# Patient Record
Sex: FEMALE | Race: WHITE | NOT HISPANIC OR LATINO | Employment: FULL TIME | URBAN - METROPOLITAN AREA
[De-identification: names, ages, dates, MRNs, and addresses within clinical notes are randomized per-mention and may not be internally consistent; named-entity substitution may affect disease eponyms.]

---

## 2023-09-15 ENCOUNTER — HOSPITAL ENCOUNTER (OUTPATIENT)
Dept: DATA CONVERSION | Facility: HOSPITAL | Age: 63
Discharge: HOME | End: 2023-09-15
Payer: COMMERCIAL

## 2023-09-15 DIAGNOSIS — C50.411 MALIGNANT NEOPLASM OF UPPER-OUTER QUADRANT OF RIGHT FEMALE BREAST (MULTI): ICD-10-CM

## 2023-11-06 ENCOUNTER — DOCUMENTATION (OUTPATIENT)
Dept: HEMATOLOGY/ONCOLOGY | Facility: HOSPITAL | Age: 63
End: 2023-11-06
Payer: COMMERCIAL

## 2023-11-06 PROBLEM — E78.5 HYPERLIPIDEMIA: Status: ACTIVE | Noted: 2023-11-06

## 2023-11-06 PROBLEM — Z90.710 HISTORY OF HYSTERECTOMY: Status: ACTIVE | Noted: 2023-11-06

## 2023-11-06 PROBLEM — R92.8 ABNORMAL FINDINGS ON DIAGNOSTIC IMAGING OF BREAST: Status: ACTIVE | Noted: 2023-11-06

## 2023-11-06 PROBLEM — E55.9 VITAMIN D DEFICIENCY: Status: ACTIVE | Noted: 2023-11-06

## 2023-11-06 PROBLEM — F32.A DEPRESSED: Status: ACTIVE | Noted: 2023-11-06

## 2023-11-06 PROBLEM — C50.419 MALIGNANT NEOPLASM OF UPPER-OUTER QUADRANT OF FEMALE BREAST (MULTI): Status: ACTIVE | Noted: 2023-11-06

## 2023-11-06 PROBLEM — F41.9 ANXIETY: Status: ACTIVE | Noted: 2023-11-06

## 2023-11-06 RX ORDER — CHOLECALCIFEROL (VITAMIN D3) 50 MCG
2 TABLET ORAL DAILY
COMMUNITY

## 2023-11-06 RX ORDER — UBIDECARENONE 60 MG
CAPSULE ORAL
COMMUNITY

## 2023-11-06 RX ORDER — UBIDECARENONE 50 MG
CAPSULE ORAL
COMMUNITY
End: 2024-01-16 | Stop reason: ALTCHOICE

## 2023-11-06 RX ORDER — ASCORBIC ACID 500 MG
1 TABLET ORAL DAILY
COMMUNITY

## 2023-11-06 RX ORDER — ANASTROZOLE 1 MG/1
1 TABLET ORAL DAILY
COMMUNITY
End: 2024-01-16 | Stop reason: ALTCHOICE

## 2023-11-06 RX ORDER — NAPROXEN SODIUM 220 MG/1
1 TABLET ORAL DAILY
COMMUNITY

## 2023-11-06 RX ORDER — GLUCOSAM/CHONDRO/HERB 149/HYAL 750-100 MG
1 TABLET ORAL DAILY
COMMUNITY

## 2023-11-06 RX ORDER — ERGOCALCIFEROL 1.25 MG/1
1 CAPSULE ORAL
COMMUNITY
End: 2024-01-16 | Stop reason: ALTCHOICE

## 2023-11-07 ENCOUNTER — APPOINTMENT (OUTPATIENT)
Dept: HEMATOLOGY/ONCOLOGY | Facility: HOSPITAL | Age: 63
End: 2023-11-07
Payer: COMMERCIAL

## 2023-11-07 ENCOUNTER — OFFICE VISIT (OUTPATIENT)
Dept: HEMATOLOGY/ONCOLOGY | Facility: HOSPITAL | Age: 63
End: 2023-11-07
Payer: COMMERCIAL

## 2023-11-07 VITALS
DIASTOLIC BLOOD PRESSURE: 69 MMHG | RESPIRATION RATE: 18 BRPM | OXYGEN SATURATION: 97 % | HEART RATE: 94 BPM | WEIGHT: 164.68 LBS | BODY MASS INDEX: 27.44 KG/M2 | SYSTOLIC BLOOD PRESSURE: 97 MMHG | HEIGHT: 65 IN | TEMPERATURE: 97.9 F

## 2023-11-07 DIAGNOSIS — Z85.3 HISTORY OF RIGHT BREAST CANCER: Primary | ICD-10-CM

## 2023-11-07 DIAGNOSIS — Z17.0 MALIGNANT NEOPLASM OF UPPER-OUTER QUADRANT OF RIGHT BREAST IN FEMALE, ESTROGEN RECEPTOR POSITIVE (MULTI): Primary | ICD-10-CM

## 2023-11-07 DIAGNOSIS — C50.411 MALIGNANT NEOPLASM OF UPPER-OUTER QUADRANT OF RIGHT BREAST IN FEMALE, ESTROGEN RECEPTOR POSITIVE (MULTI): Primary | ICD-10-CM

## 2023-11-07 PROCEDURE — 99214 OFFICE O/P EST MOD 30 MIN: CPT | Performed by: NURSE PRACTITIONER

## 2023-11-07 RX ORDER — AMPICILLIN TRIHYDRATE 250 MG
CAPSULE ORAL
COMMUNITY

## 2023-11-07 RX ORDER — ANASTROZOLE 1 MG/1
1 TABLET ORAL DAILY
Qty: 30 TABLET | Refills: 5 | Status: SHIPPED | OUTPATIENT
Start: 2023-11-07 | End: 2024-03-19 | Stop reason: SDUPTHER

## 2023-11-07 RX ORDER — GLUCOSAMINE HCL 500 MG
TABLET ORAL
COMMUNITY
End: 2024-01-16 | Stop reason: ALTCHOICE

## 2023-11-07 RX ORDER — FAMOTIDINE 10 MG/ML
20 INJECTION INTRAVENOUS ONCE AS NEEDED
Status: CANCELLED | OUTPATIENT
Start: 2024-01-15

## 2023-11-07 RX ORDER — DIPHENHYDRAMINE HYDROCHLORIDE 50 MG/ML
50 INJECTION INTRAMUSCULAR; INTRAVENOUS AS NEEDED
Status: CANCELLED | OUTPATIENT
Start: 2024-01-15

## 2023-11-07 RX ORDER — EPINEPHRINE 0.3 MG/.3ML
0.3 INJECTION SUBCUTANEOUS EVERY 5 MIN PRN
Status: CANCELLED | OUTPATIENT
Start: 2024-01-15

## 2023-11-07 RX ORDER — ACETAMINOPHEN 500 MG
2 TABLET ORAL DAILY
COMMUNITY
End: 2024-01-16 | Stop reason: ALTCHOICE

## 2023-11-07 RX ORDER — SERTRALINE HYDROCHLORIDE 100 MG/1
100 TABLET, FILM COATED ORAL DAILY
COMMUNITY
End: 2024-01-16 | Stop reason: ALTCHOICE

## 2023-11-07 RX ORDER — ALBUTEROL SULFATE 0.83 MG/ML
3 SOLUTION RESPIRATORY (INHALATION) AS NEEDED
Status: CANCELLED | OUTPATIENT
Start: 2024-01-15

## 2023-11-07 RX ORDER — OMEGA-3-ACID ETHYL ESTERS 1 G/1
1200 CAPSULE, LIQUID FILLED ORAL DAILY
COMMUNITY
End: 2024-01-16 | Stop reason: ALTCHOICE

## 2023-11-07 ASSESSMENT — PATIENT HEALTH QUESTIONNAIRE - PHQ9
4. FEELING TIRED OR HAVING LITTLE ENERGY: NOT AT ALL
2. FEELING DOWN, DEPRESSED OR HOPELESS: NOT AT ALL
5. POOR APPETITE OR OVEREATING: 0
8. MOVING OR SPEAKING SO SLOWLY THAT OTHER PEOPLE COULD HAVE NOTICED. OR THE OPPOSITE, BEING SO FIGETY OR RESTLESS THAT YOU HAVE BEEN MOVING AROUND A LOT MORE THAN USUAL: NOT AT ALL
3. TROUBLE FALLING OR STAYING ASLEEP OR SLEEPING TOO MUCH: NOT AT ALL
8. MOVING OR SPEAKING SO SLOWLY THAT OTHER PEOPLE COULD HAVE NOTICED. OR THE OPPOSITE, BEING SO FIGETY OR RESTLESS THAT YOU HAVE BEEN MOVING AROUND A LOT MORE THAN USUAL: NOT AT ALL
5. POOR APPETITE OR OVEREATING: NOT AT ALL
7. TROUBLE CONCENTRATING ON THINGS, SUCH AS READING THE NEWSPAPER OR WATCHING TELEVISION: 0
1. LITTLE INTEREST OR PLEASURE IN DOING THINGS: NOT AT ALL
7. TROUBLE CONCENTRATING ON THINGS, SUCH AS READING THE NEWSPAPER OR WATCHING TELEVISION: NOT AT ALL
1. LITTLE INTEREST OR PLEASURE IN DOING THINGS: 0
6. FEELING BAD ABOUT YOURSELF - OR THAT YOU ARE A FAILURE OR HAVE LET YOURSELF OR YOUR FAMILY DOWN: 0
SUM OF ALL RESPONSES TO PHQ QUESTIONS 1-9: 0
3. TROUBLE FALLING OR STAYING ASLEEP OR SLEEPING TOO MUCH: NOT AT ALL
4. FEELING TIRED OR HAVING LITTLE ENERGY: NOT AT ALL
1. LITTLE INTEREST OR PLEASURE IN DOING THINGS: NOT AT ALL
3. TROUBLE FALLING OR STAYING ASLEEP OR SLEEPING TOO MUCH: 0
5. POOR APPETITE OR OVEREATING: NOT AT ALL
6. FEELING BAD ABOUT YOURSELF - OR THAT YOU ARE A FAILURE OR HAVE LET YOURSELF OR YOUR FAMILY DOWN: NOT AT ALL
10. IF YOU CHECKED OFF ANY PROBLEMS, HOW DIFFICULT HAVE THESE PROBLEMS MADE IT FOR YOU TO DO YOUR WORK, TAKE CARE OF THINGS AT HOME, OR GET ALONG WITH OTHER PEOPLE: NOT DIFFICULT AT ALL
6. FEELING BAD ABOUT YOURSELF - OR THAT YOU ARE A FAILURE OR HAVE LET YOURSELF OR YOUR FAMILY DOWN: NOT AT ALL
8. MOVING OR SPEAKING SO SLOWLY THAT OTHER PEOPLE COULD HAVE NOTICED. OR THE OPPOSITE, BEING SO FIGETY OR RESTLESS THAT YOU HAVE BEEN MOVING AROUND A LOT MORE THAN USUAL: 0
9. THOUGHTS THAT YOU WOULD BE BETTER OFF DEAD, OR OF HURTING YOURSELF: NOT AT ALL
9. THOUGHTS THAT YOU WOULD BE BETTER OFF DEAD, OR OF HURTING YOURSELF: 0
2. FEELING DOWN, DEPRESSED, IRRITABLE, OR HOPELESS: 0
7. TROUBLE CONCENTRATING ON THINGS, SUCH AS READING THE NEWSPAPER OR WATCHING TELEVISION: NOT AT ALL
4. FEELING TIRED OR HAVING LITTLE ENERGY: 0
9. THOUGHTS THAT YOU WOULD BE BETTER OFF DEAD, OR OF HURTING YOURSELF: NOT AT ALL
10. IF YOU CHECKED OFF ANY PROBLEMS, HOW DIFFICULT HAVE THESE PROBLEMS MADE IT FOR YOU TO DO YOUR WORK, TAKE CARE OF THINGS AT HOME, OR GET ALONG WITH OTHER PEOPLE: NOT DIFFICULT AT ALL
2. FEELING DOWN, DEPRESSED, IRRITABLE, OR HOPELESS: NOT AT ALL
SUM OF ALL RESPONSES TO PHQ QUESTIONS 1-9: 0

## 2023-11-07 ASSESSMENT — PAIN SCALES - GENERAL: PAINLEVEL: 0-NO PAIN

## 2023-11-07 NOTE — PATIENT INSTRUCTIONS
1. Exercise 2.5 hours per week; bone strengthening, cardio-vascular, resistance training.  2. Please do self breast exams monthly.  3. Keep alcohol under 3 drinks per week.  4. Sun safety - limit sun exposure from 11a-2p when its at its hottest, apply 15-30 sun block and re-apply every 1-2 hours if perspiring or swimming.  5. Eat a plant based diet, add in oily fishes such as mackerel, tuna, and salmon.  6. Get in at least 1,000 mg of calcium per day through diet or supplement for bone strength. Examples of foods higher in calcium are milk, yogurt, fruited yogurt, oranges, fortified orange juice, almonds, almond milk, broccoli, spinach, bok dmitri, mustard greens, puddings, custards, ice cream, fortified cereals, bars, and crackers.   7. Continue anastrozole 1mg daily. I will send in a new prescription.  8. Please call the office if any new mass or rash in or around breast, or any uncontrolled symptoms that last over 2-3 weeks at [phone].  9. We will start your Zometa infusions in January. Please remember to have your labs drawn 1-2 weeks before your infusion or it cannot be given. You can go to the closest  facility to have those drawn. Our schedules will call you with that appointment.  10. It was so nice meeting you today, Rebeca.  I will see you back in April.

## 2023-11-07 NOTE — PROGRESS NOTES
Visit Type: Follow Up Visit      Cancer History:          Breast         AJCC Edition: 8th (AJCC), Diagnosis Date: 04-Nov-2022, IA, pT1b pN1mi cM0 G1     Treatment Synopsis:    63-year-old postmenopausal  female with right-sided invasive ductal carcinoma, stage IA (T1b N1mi M0). The patient's breast cancer was diagnosed on November 14, 2022, and is grade 1, estrogen receptor positive at 80- 90%, progesterone receptor positive at 80-90%, and HER-2/samantha negative. MammaPrint Index = -0.024; translating to High Risk Luminal-Type B.        Details of her history are as follows:      04//02/2022: Patient underwent a routine screening mammogram. This revealed an asymmetry in her right breast.    4/05/2022: Patient underwent a right diagnostic mammogram and US. This confirmed an asymmetry in the left breast that was non-revealing on the US.   11/02/2022: Patient underwent a repeat 6-month right diagnostic mammogram and US. This revealed a 0.5cm mass in the right breast at 11-12:00, 7.4cm from the nipple measuring 0.5cm. No abnormal LNs were seen in the right axilla.   11/04/2022: Patient underwent and US -guided needle biopsy of the right breast at 12:00. Pathology was consistent with above   01/05/2023: Patient underwent a right partial mastectomy with SLNB. Pathology showed an 8mm invasive ductal carcinoma, grade 1 with negative margins, no LVI with 1/1 SLN with micrometastatic disease measuring 0.5mm           History of Present Illness:      ID Statement:    CLAU NIX is a 63 year old Female        Chief Complaint: Here for a follow-up visit      Interval History:    Ms. Nix is a pleasant 63-year-old postmenopausal  female with right-sided invasive ductal carcinoma, stage IA (T1b N1mi M0). The patient's breast cancer  was diagnosed on November 14, 2022, and is grade 1, estrogen receptor positive at 80- 90%, progesterone receptor positive at 80-90%, and HER-2/samantha negative. MammaPrint Index =  -0.024; translating to High Risk Luminal-Type B.     INTERVAL HISTORY  In the interval since her last visit, Ms. Flores reports she has completed radiation with no side effects. She comes in to finalize her endocrine treatment plan. She has no complaints.      Review of Systems:   Review of Systems:    ROS:  A 14-point review of system was completed and was negative except for what is noted in HPI.              Allergies and Intolerances:       Allergies:         No Known Allergies: Active     Outpatient Medication Profile:  * Patient Currently Takes Medications as of 02-May-2023 10:00 documented in Structured Notes          mg oral tablet : Last Dose Taken:  , prn         Multiple Vitamins oral gum: Last Dose Taken:           sertraline 100 mg oral tablet: Last Dose Taken:  , 1 tab(s) orally once  a day         Red Yeast Rice 600 mg oral capsule: Last Dose Taken:  , 2 cap(s) orally  once a day         Omega-3 oral capsule: Last Dose Taken:           glucosamine hydrochloride 1500 mg oral tablet: Last Dose Taken:  , 1  tab(s) orally once a day         cholecalciferol 25 mcg (1000 intl units) oral tablet, chewable: Last  Dose Taken:  , 2 tab(s) orally once a day         Coenzyme Q10 200 mg oral capsule: Last Dose Taken:           calcium citrate: Last Dose Taken:  , 600 milligram(s) orally         ascorbic acid: Last Dose Taken:  , 1000 milligram(s) orally         a c vinegar cap: Last Dose Taken:               Medical History:         Breast cancer, stage 1, estrogen receptor positive: ICD-10:  C50.919, Status: Active       Surg History:         History of partial mastectomy: ICD-10: Z90.10, Status: Active     Family History: No Family History items are recorded  in the problem list.       Social History:   Social Substance History:  ·  Smoking Status never smoker (1)   ·  Additional History           Family History:  Father had NHL, .     Breast Cancer Risk Factors:  , Had her menarche at age 15  years, 1st pregnancy at age 35 years, menopause at age 57 years, never used BCP or HRT  (1)           Performance:   ECOG Performance Status: 0 Fully Active         Vitals and Measurements:   Vitals: Temp: 36.1  HR: 74  RR: 18  BP: 114/74  SPO2%:   98   Measurements: HT(cm): 161.7  WT(kg): 79.2  BSA: 1.88   BMI:  30.2      Physical Exam:      Constitutional: Well developed, awake/alert/oriented  x3, no distress, alert and cooperative   Eyes: PERRL, EOMI, clear sclera   ENMT: mucous membranes moist, no apparent injury,  no lesions seen   Head/Neck: Neck supple, no apparent injury, thyroid  without mass or tenderness, No JVD, trachea midline, no bruits   Respiratory/Thorax: Patent airways, CTAB, normal  breath sounds with good chest expansion, thorax symmetric   Cardiovascular: Regular, rate and rhythm, no murmurs,  2+ equal pulses of the extremities, normal S 1and S 2   Gastrointestinal: Nondistended, soft, non-tender,  no rebound tenderness or guarding, no masses palpable, no organomegaly, +BS, no bruits   Musculoskeletal: ROM intact, no joint swelling, normal  strength   Extremities: normal extremities, no cyanosis edema,  contusions or wounds, no clubbing   Neurological: alert and oriented x3, intact senses,  motor, response and reflexes, normal strength   Breast: s/p rt sided partial mastectomy with well  healed surgical incision. No palpable mass in the left breast. No palpable axillary or supraclavicular lymphadenopathies bilaterally. No swellling of either upper extremity which had free range of motion.   Lymphatic: No significant lymphadenopathy   Psychological: Appropriate mood and behavior   Skin: Warm and dry, no lesions, no rashes      Assessment and Plan:      Assessment and Plan:   Assessment:    Ms. Ko is a pleasant 63-year-old postmenopausal  female with right-sided invasive ductal carcinoma, stage IA (T1b N1mi M0). The patient's breast cancer  was diagnosed on November 14, 2022, and is  grade 1, estrogen receptor positive at 80- 90%, progesterone receptor positive at 80-90%, and HER-2/samantha negative. MammaPrint Index = -0.024; translating to High Risk Luminal-Type B.     Patient is s/p primary breast surgery, and is s/p radiation therapy. She comes in today to finalize adjuvant systemic endocrine treatment options.     Given patient's small tumor size, micrometastatic node-positive disease, grade 1 disease and hormone receptor positivity, her clinical risk of recurrence is low. However her genomic risk is high. In the MINDACT study post menopausal women with clinical  low risk/genomic high risk disease did not appear to have significant benefit from chemotherapy with 5-year survival rate of 95.8% and 95%,respectively, with and without chemotherapy and therefore I do recommend chemotherapy but rather systemic endocrine  therapy with an AI plus adjuvant bisphosphonate.      The two recommended systemic endocrine therapies, Aromatase Inhibitors and Tamoxifen, were discussed with patient. Side effects that were discussed included but were not limited to osteoporosis, arthritis arthralgia vaginal bleeding VTE, endometrial cancer,  hot flashes, liver toxicity. After much deliberation patient elected to proceed treatment with Arimidex              Plan:     Arimidex 1 mg by mouth daily   Dexa before next visit   Calcium and Vitamin D supplements   Bilateral mammogram end of June   Adjuvant Zometa-Patient will consider   RTC 6 months

## 2023-11-07 NOTE — PROGRESS NOTES
Oncology Follow-Up    Rebeca Ko  [id number]                AJCC Edition: 8th (AJCC), Diagnosis Date: 04-Nov-2022, IA, pT1b pN1mi cM0 G1   Oncology History    No history exists.   63-year-old postmenopausal  female with right-sided invasive ductal carcinoma, stage IA (T1b N1mi M0). The patient's breast cancer was diagnosed on November 14, 2022, and is grade 1, estrogen receptor positive at 80- 90%, progesterone receptor positive at 80-90%, and HER-2/samantha negative. MammaPrint Index = -0.024; translating to High Risk Luminal-Type B.        Details of her history are as follows:      04//02/2022: Patient underwent a routine screening mammogram. This revealed an asymmetry in her right breast.    4/05/2022: Patient underwent a right diagnostic mammogram and US. This confirmed an asymmetry in the left breast that was non-revealing on the US.   11/02/2022: Patient underwent a repeat 6-month right diagnostic mammogram and US. This revealed a 0.5cm mass in the right breast at 11-12:00, 7.4cm from the nipple measuring 0.5cm. No abnormal LNs were seen in the right axilla.   11/04/2022: Patient underwent and US -guided needle biopsy of the right breast at 12:00. Pathology was consistent with above   01/05/2023: Patient underwent a right partial mastectomy with SLNB. Pathology showed an 8mm invasive ductal carcinoma, grade 1 with negative margins, no LVI with 1/1 SLN with micrometastatic disease measuring 0.5mm      Subjective    Rebeca presents for her Initial survivorship visit. Rebeca is , she has two adult daughters, she works part-time as a . She reports right axillar/breast lymphedema. She is having lymphedema massage which has helped. She is tolerating anastrozole without side effects. She was exercising though has fallen off. She is is doing monthly Breast self exams. She rates her energy level as 7-8/10 and reports no distress. She denies any unusual headaches, balance issues,  depression, cough, shortness of breath, problems swallowing, changes in chest/breast area, abdominal pain, bone or muscle pain, vaginal bleeding, rectal bleeding, blood in the urine, vaginal dryness, swelling arms or legs, new or unusual skin moles or lesions.         Objective      Vitals:    11/07/23 1101   BP: 97/69   Pulse: 94   Resp: 18   Temp: 36.6 °C (97.9 °F)   SpO2: 97%        Constitutional: Well developed, alert/oriented x3, no distress, cooperative   Eyes: clear sclera   ENMT: mucous membranes moist, no apparent lesions   Head/Neck: Neck supple, no bruits   Respiratory/Thorax: Patent airways, normal breath sounds with good chest expansion   Cardiovascular: Regular rate and rhythm, no murmurs, 2+ equal pulses of the extremities,   Gastrointestinal: Nondistended, soft, non-tender, no masses palpable, no organomegaly   Musculoskeletal: ROM intact, no joint swelling, normal strength   Extremities: normal extremities, no edema, cyanosis, contusions or wounds   Neurological: alert and oriented x3,  normal strength   Breast:     Lymphatic: No significant lymphadenopathy   Psychological: Appropriate mood and behavior   Skin: Warm and dry, no lesions, no rashes      Physical Exam  Chest:          Comments: Right breast + for partial mastectomy with well healed upper/outer incision and right axillary incision; no masses, nodules, skin changes, discharge. There is hyperpigmentation. Left breast without masses, nodules, skin changes, discharge.          Lab Results   Component Value Date    WBC 6.6 04/04/2023    HGB 14.3 04/04/2023    HCT 44.2 (H) 04/04/2023    MCV 87.4 04/04/2023     04/04/2023       Chemistry    Lab Results   Component Value Date/Time     04/04/2023 1235    K 4.0 04/04/2023 1235     04/04/2023 1235    CO2 26 04/04/2023 1235    BUN 13 04/04/2023 1235    CREATININE 0.8 04/04/2023 1235    Lab Results   Component Value Date/Time    CALCIUM 9.6 04/04/2023 1235    ALKPHOS 58  04/04/2023 1235    AST 19 04/04/2023 1235    ALT 13 04/04/2023 1235    BILITOT 0.5 04/04/2023 1235              Imaging:  Narrative & Impression   PROCEDURE:         BREAST 3D CECE BI DIAG - IMM  [id number]  REASON FOR EXAM: C50.411     RESULT: MRN: [id number]  Patient Name: REBECA NIX     STUDY:  BREAST 3D CECE BI DIAG; 9/15/2023 8:23 am     INDICATION:  C50.411; /history of right breast cancer/lumpectomy January 2023     COMPARISON:  Multiple/latest 01/05/2023 and 04/02/2022     ACCESSION NUMBER(S):  [id number]     ORDERING CLINICIAN:  KAREN LANTIGUA     TECHNIQUE:  Multiple views of the bilateral breasts were obtained. Computer-aided  detection (CAD) was utilized. 3-D Tomosynthesis was utilized for this  examination with tomosynthesis images obtained in both the CC and MLO  projections.     FINDINGS:  There are scattered fibroglandular densities.     Postlumpectomy changes are seen within the upper-outer quadrant of the right  breast. Areas of fat necrosis can be seen. Postsurgical changes are seen  within the right axilla. Skin thickening of the right breast likely relate  to postradiation changes.     Benign calcifications of the breasts are noted bilaterally. Benign-appearing  densities of the left breast are noted.     There are no masses, pathologic microcalcifications, or other secondary  signs of breast carcinoma.     IMPRESSION:  BI-RADS CATEGORY 2- Benign Findings. Follow-up per schedule.          Assessment/Plan    Rebeca is a 64 yo woman with a history of right IDC diagnosed in November 2022. She is s/p partial mastectomy, XRT, and is currently on anastrozole with good tolerance. She will start Zometa in January. There is no evidence of recurrent disease on today's exam.   Plan:  Exam is negative.  Continue anastrozole. I will send in another refill.  Discussed Zometa infusions for prevention of osteoporosis and bone metastasis. Rebeca would like to proceed with treatment. We reviewed side effects including  but not limited to bone aches, chills, allergic reaction. We discussed that this is given every 6 months by IV infusion. She will need labs prior to each infusion. Rebeca verbalized understanding.  Encouraged monthly breast self exams, plant based diet, keep alcohol <3 drinks/week, exercise at least 2.5 hours/week.   We reviewed signs/symptoms of recurrence including new masses, new pigmented lesion, tugging or pulling of the skin, nipple discharge, rash in or around the chest area, or any new finding that doesn't resolve within a 2-3 weeks.  All of Rebeca's questions/concerns were addressed.  Over 25 minutes of time was spent with this patient with >50% of the time with education, counseling, and coordination of care.   I will see her back in April. She will call with any concerns.  Diagnoses and all orders for this visit:  History of right breast cancer  -     Clinic Appointment Request Follow Up; ARIES ALICIA; Future          Aries Alicia, NIEVES-CNP

## 2024-01-10 ENCOUNTER — LAB (OUTPATIENT)
Dept: LAB | Facility: LAB | Age: 64
End: 2024-01-10
Payer: COMMERCIAL

## 2024-01-10 DIAGNOSIS — Z17.0 MALIGNANT NEOPLASM OF UPPER-OUTER QUADRANT OF RIGHT BREAST IN FEMALE, ESTROGEN RECEPTOR POSITIVE (MULTI): ICD-10-CM

## 2024-01-10 DIAGNOSIS — C50.411 MALIGNANT NEOPLASM OF UPPER-OUTER QUADRANT OF RIGHT BREAST IN FEMALE, ESTROGEN RECEPTOR POSITIVE (MULTI): ICD-10-CM

## 2024-01-10 LAB
ALBUMIN SERPL-MCNC: 4.4 G/DL (ref 3.5–5)
ALP BLD-CCNC: 99 U/L (ref 35–125)
ALT SERPL-CCNC: 19 U/L (ref 5–40)
ANION GAP SERPL CALC-SCNC: 14 MMOL/L
AST SERPL-CCNC: 19 U/L (ref 5–40)
BILIRUB SERPL-MCNC: <0.2 MG/DL (ref 0.1–1.2)
BUN SERPL-MCNC: 16 MG/DL (ref 8–25)
CALCIUM SERPL-MCNC: 10 MG/DL (ref 8.5–10.4)
CHLORIDE SERPL-SCNC: 102 MMOL/L (ref 97–107)
CO2 SERPL-SCNC: 28 MMOL/L (ref 24–31)
CREAT SERPL-MCNC: 0.8 MG/DL (ref 0.4–1.6)
EGFRCR SERPLBLD CKD-EPI 2021: 84 ML/MIN/1.73M*2
GLUCOSE SERPL-MCNC: 94 MG/DL (ref 65–99)
MAGNESIUM SERPL-MCNC: 2.1 MG/DL (ref 1.6–3.1)
PHOSPHATE SERPL-MCNC: 4.4 MG/DL (ref 2.5–4.5)
POTASSIUM SERPL-SCNC: 4.6 MMOL/L (ref 3.4–5.1)
PROT SERPL-MCNC: 6.7 G/DL (ref 5.9–7.9)
SODIUM SERPL-SCNC: 144 MMOL/L (ref 133–145)

## 2024-01-10 PROCEDURE — 83735 ASSAY OF MAGNESIUM: CPT

## 2024-01-10 PROCEDURE — 80053 COMPREHEN METABOLIC PANEL: CPT

## 2024-01-10 PROCEDURE — 84100 ASSAY OF PHOSPHORUS: CPT

## 2024-01-10 PROCEDURE — 36415 COLL VENOUS BLD VENIPUNCTURE: CPT

## 2024-01-12 RX ORDER — HEPARIN 100 UNIT/ML
500 SYRINGE INTRAVENOUS AS NEEDED
Status: CANCELLED | OUTPATIENT
Start: 2024-01-16

## 2024-01-12 RX ORDER — HEPARIN SODIUM,PORCINE/PF 10 UNIT/ML
50 SYRINGE (ML) INTRAVENOUS AS NEEDED
Status: CANCELLED | OUTPATIENT
Start: 2024-01-16

## 2024-01-15 ENCOUNTER — APPOINTMENT (OUTPATIENT)
Dept: HEMATOLOGY/ONCOLOGY | Facility: CLINIC | Age: 64
End: 2024-01-15
Payer: COMMERCIAL

## 2024-01-16 ENCOUNTER — INFUSION (OUTPATIENT)
Dept: HEMATOLOGY/ONCOLOGY | Facility: CLINIC | Age: 64
End: 2024-01-16
Payer: COMMERCIAL

## 2024-01-16 VITALS
BODY MASS INDEX: 28.62 KG/M2 | OXYGEN SATURATION: 99 % | RESPIRATION RATE: 16 BRPM | WEIGHT: 171.96 LBS | SYSTOLIC BLOOD PRESSURE: 126 MMHG | TEMPERATURE: 96.8 F | DIASTOLIC BLOOD PRESSURE: 51 MMHG | HEART RATE: 79 BPM

## 2024-01-16 DIAGNOSIS — Z17.0 MALIGNANT NEOPLASM OF UPPER-OUTER QUADRANT OF BREAST IN FEMALE, ESTROGEN RECEPTOR POSITIVE, UNSPECIFIED LATERALITY (MULTI): ICD-10-CM

## 2024-01-16 DIAGNOSIS — C50.419 MALIGNANT NEOPLASM OF UPPER-OUTER QUADRANT OF BREAST IN FEMALE, ESTROGEN RECEPTOR POSITIVE, UNSPECIFIED LATERALITY (MULTI): ICD-10-CM

## 2024-01-16 DIAGNOSIS — Z17.0 MALIGNANT NEOPLASM OF UPPER-OUTER QUADRANT OF RIGHT BREAST IN FEMALE, ESTROGEN RECEPTOR POSITIVE (MULTI): ICD-10-CM

## 2024-01-16 DIAGNOSIS — C50.411 MALIGNANT NEOPLASM OF UPPER-OUTER QUADRANT OF RIGHT BREAST IN FEMALE, ESTROGEN RECEPTOR POSITIVE (MULTI): ICD-10-CM

## 2024-01-16 PROCEDURE — 2500000004 HC RX 250 GENERAL PHARMACY W/ HCPCS (ALT 636 FOR OP/ED): Performed by: NURSE PRACTITIONER

## 2024-01-16 PROCEDURE — 96365 THER/PROPH/DIAG IV INF INIT: CPT | Mod: INF

## 2024-01-16 RX ORDER — ALBUTEROL SULFATE 0.83 MG/ML
3 SOLUTION RESPIRATORY (INHALATION) AS NEEDED
Status: DISCONTINUED | OUTPATIENT
Start: 2024-01-16 | End: 2024-01-16 | Stop reason: HOSPADM

## 2024-01-16 RX ORDER — ZOLEDRONIC ACID 0.04 MG/ML
4 INJECTION, SOLUTION INTRAVENOUS ONCE
Status: COMPLETED | OUTPATIENT
Start: 2024-01-16 | End: 2024-01-16

## 2024-01-16 RX ORDER — HEPARIN 100 UNIT/ML
500 SYRINGE INTRAVENOUS AS NEEDED
OUTPATIENT
Start: 2024-01-16

## 2024-01-16 RX ORDER — FAMOTIDINE 10 MG/ML
20 INJECTION INTRAVENOUS ONCE AS NEEDED
OUTPATIENT
Start: 2024-06-25

## 2024-01-16 RX ORDER — EPINEPHRINE 0.3 MG/.3ML
0.3 INJECTION SUBCUTANEOUS EVERY 5 MIN PRN
Status: DISCONTINUED | OUTPATIENT
Start: 2024-01-16 | End: 2024-01-16 | Stop reason: HOSPADM

## 2024-01-16 RX ORDER — ALBUTEROL SULFATE 0.83 MG/ML
3 SOLUTION RESPIRATORY (INHALATION) AS NEEDED
OUTPATIENT
Start: 2024-06-25

## 2024-01-16 RX ORDER — HEPARIN SODIUM,PORCINE/PF 10 UNIT/ML
50 SYRINGE (ML) INTRAVENOUS AS NEEDED
OUTPATIENT
Start: 2024-01-16

## 2024-01-16 RX ORDER — DIPHENHYDRAMINE HYDROCHLORIDE 50 MG/ML
50 INJECTION INTRAMUSCULAR; INTRAVENOUS AS NEEDED
Status: DISCONTINUED | OUTPATIENT
Start: 2024-01-16 | End: 2024-01-16 | Stop reason: HOSPADM

## 2024-01-16 RX ORDER — EPINEPHRINE 0.3 MG/.3ML
0.3 INJECTION SUBCUTANEOUS EVERY 5 MIN PRN
OUTPATIENT
Start: 2024-06-25

## 2024-01-16 RX ORDER — HEPARIN 100 UNIT/ML
500 SYRINGE INTRAVENOUS AS NEEDED
Status: DISCONTINUED | OUTPATIENT
Start: 2024-01-16 | End: 2024-01-16 | Stop reason: HOSPADM

## 2024-01-16 RX ORDER — HEPARIN SODIUM,PORCINE/PF 10 UNIT/ML
50 SYRINGE (ML) INTRAVENOUS AS NEEDED
Status: DISCONTINUED | OUTPATIENT
Start: 2024-01-16 | End: 2024-01-16 | Stop reason: HOSPADM

## 2024-01-16 RX ORDER — FAMOTIDINE 10 MG/ML
20 INJECTION INTRAVENOUS ONCE AS NEEDED
Status: DISCONTINUED | OUTPATIENT
Start: 2024-01-16 | End: 2024-01-16 | Stop reason: HOSPADM

## 2024-01-16 RX ORDER — DIPHENHYDRAMINE HYDROCHLORIDE 50 MG/ML
50 INJECTION INTRAMUSCULAR; INTRAVENOUS AS NEEDED
OUTPATIENT
Start: 2024-06-25

## 2024-01-16 RX ADMIN — ZOLEDRONIC ACID 4 MG: 0.04 INJECTION, SOLUTION INTRAVENOUS at 13:32

## 2024-01-16 RX ADMIN — SODIUM CHLORIDE 500 ML: 9 INJECTION, SOLUTION INTRAVENOUS at 13:29

## 2024-01-16 ASSESSMENT — PAIN SCALES - GENERAL: PAINLEVEL: 0-NO PAIN

## 2024-03-19 ENCOUNTER — OFFICE VISIT (OUTPATIENT)
Dept: HEMATOLOGY/ONCOLOGY | Facility: HOSPITAL | Age: 64
End: 2024-03-19
Payer: COMMERCIAL

## 2024-03-19 VITALS
HEIGHT: 65 IN | HEART RATE: 71 BPM | SYSTOLIC BLOOD PRESSURE: 115 MMHG | WEIGHT: 169.75 LBS | DIASTOLIC BLOOD PRESSURE: 68 MMHG | OXYGEN SATURATION: 95 % | TEMPERATURE: 96.6 F | BODY MASS INDEX: 28.28 KG/M2 | RESPIRATION RATE: 18 BRPM

## 2024-03-19 DIAGNOSIS — Z17.0 MALIGNANT NEOPLASM OF UPPER-OUTER QUADRANT OF RIGHT BREAST IN FEMALE, ESTROGEN RECEPTOR POSITIVE (MULTI): Primary | ICD-10-CM

## 2024-03-19 DIAGNOSIS — Z79.811 ENCOUNTER FOR MONITORING AROMATASE INHIBITOR THERAPY: ICD-10-CM

## 2024-03-19 DIAGNOSIS — Z85.3 HISTORY OF RIGHT BREAST CANCER: ICD-10-CM

## 2024-03-19 DIAGNOSIS — Z51.81 ENCOUNTER FOR MONITORING AROMATASE INHIBITOR THERAPY: ICD-10-CM

## 2024-03-19 DIAGNOSIS — C50.411 MALIGNANT NEOPLASM OF UPPER-OUTER QUADRANT OF RIGHT BREAST IN FEMALE, ESTROGEN RECEPTOR POSITIVE (MULTI): Primary | ICD-10-CM

## 2024-03-19 PROCEDURE — 99214 OFFICE O/P EST MOD 30 MIN: CPT | Performed by: NURSE PRACTITIONER

## 2024-03-19 RX ORDER — ANASTROZOLE 1 MG/1
1 TABLET ORAL DAILY
Qty: 30 TABLET | Refills: 3 | Status: SHIPPED | OUTPATIENT
Start: 2024-03-19

## 2024-03-19 RX ORDER — ANASTROZOLE 1 MG/1
1 TABLET ORAL DAILY
Qty: 30 TABLET | Refills: 3 | Status: SHIPPED | OUTPATIENT
Start: 2024-03-19 | End: 2024-03-19 | Stop reason: SDUPTHER

## 2024-03-19 ASSESSMENT — PAIN SCALES - GENERAL: PAINLEVEL: 0-NO PAIN

## 2024-03-19 NOTE — PATIENT INSTRUCTIONS
1. Exercise 2.5 hours per week; bone strengthening, cardio-vascular, resistance training.  2. Please do self breast exams monthly.  3. Keep alcohol under 3 drinks per week.  4. Sun safety - limit sun exposure from 11a-2p when its at its hottest, apply 15-30 sun block and re-apply every 1-2 hours if perspiring or swimming.  5. Eat a plant based diet, add in oily fishes such as mackerel, tuna, and salmon.  6. Get in at least 1,000 mg of calcium per day through diet or supplement for bone strength. Examples of foods higher in calcium are milk, yogurt, fruited yogurt, oranges, fortified orange juice, almonds, almond milk, broccoli, spinach, bok dmitri, mustard greens, puddings, custards, ice cream, fortified cereals, bars, and crackers.   7. Continue anastrozole 1mg daily.   8. I will add IV fluids to your next Zometa infusion. Hopefully, this will help with the side effects you experience from dose #1. Rotate ibuprofen and acetaminophen for any pain/discomfort after your next infusion as needed.    9. Please call the office if any new mass or rash in or around breast, or any uncontrolled symptoms that last over 2-3 weeks at [phone].  10. It was nice seeing you today, Rebeca! I will see you back in January.   Have a nice spring and summer!  Thank you for choosing [facility] for your care.

## 2024-03-19 NOTE — PROGRESS NOTES
Oncology Follow-Up    Rebeca Ko  [id number]                AJCC Edition: 8th (AJCC), Diagnosis Date: 04-Nov-2022, IA, pT1b pN1mi cM0 G1   Oncology History    No history exists.     Patient's Oncology History documentation       Oncology History     No history exists.      63-year-old postmenopausal  female with right-sided invasive ductal carcinoma, stage IA (T1b N1mi M0). The patient's breast cancer was diagnosed on November 14, 2022, and is grade 1, estrogen receptor positive at 80- 90%, progesterone receptor positive at 80-90%, and HER-2/samantha negative. MammaPrint Index = -0.024; translating to High Risk Luminal-Type B.        Details of her history are as follows:      04//02/2022: Patient underwent a routine screening mammogram. This revealed an asymmetry in her right breast.    4/05/2022: Patient underwent a right diagnostic mammogram and US. This confirmed an asymmetry in the left breast that was non-revealing on the US.   11/02/2022: Patient underwent a repeat 6-month right diagnostic mammogram and US. This revealed a 0.5cm mass in the right breast at 11-12:00, 7.4cm from the nipple measuring 0.5cm. No abnormal LNs were seen in the right axilla.   11/04/2022: Patient underwent and US -guided needle biopsy of the right breast at 12:00. Pathology was consistent with above   01/05/2023: Patient underwent a right partial mastectomy with SLNB. Pathology showed an 8mm invasive ductal carcinoma, grade 1 with negative margins, no LVI with 1/1 SLN with micrometastatic disease measuring 0.5mm      Subjective    Rebeca presents for her Routine follow up visit. She reports having severe side effects from her first dose of Zometa. She had severe bone aches and headaches that lasted 2 weeks. She had to leave work early 2 days after her dose. Rebeca rates her energy level as 8/10 and has no distress though one of her adult children is not speaking to her and her . She is not exercising at this time.  Rebeca continues on anastrozole with good tolerance. Rebeca denies any unusual headaches, balance issues, depression, cough, shortness of breath, problems swallowing, changes in chest/breast area, abdominal pain, bone or muscle pain, vaginal bleeding, rectal bleeding, blood in the urine, vaginal dryness, swelling arms or legs, new or unusual skin moles or lesions.     Objective      Vitals:    03/19/24 0926   BP: 115/68   Pulse: 71   Resp: 18   Temp: 35.9 °C (96.6 °F)   SpO2: 95%        Constitutional: Well developed, alert/oriented x3, no distress, cooperative   Eyes: clear sclera   ENMT: mucous membranes moist, no apparent lesions   Head/Neck: Neck supple, no bruits   Respiratory/Thorax: Patent airways, normal breath sounds with good chest expansion   Cardiovascular: Regular rate and rhythm, no murmurs, 2+ equal pulses of the extremities,   Gastrointestinal: Nondistended, soft, non-tender, no masses palpable, no organomegaly   Musculoskeletal: ROM intact, no joint swelling, normal strength   Extremities: normal extremities, no edema, cyanosis, contusions or wounds   Neurological: alert and oriented x3,  normal strength   Breast:   Lymphatic: No significant lymphadenopathy   Psychological: Appropriate mood and behavior   Skin: Warm and dry, no lesions, no rashes      Physical Exam  Chest:          Comments: Right breast + for breast conserving surgery with well healed UOQ and right axillary incisions; no masses, nodules, skin changes, discharge. She continues to have right hyperpigmentation secondary to XRT. Left breast without masses, nodules, skin changes, discharge. Scattered dense tissue bilaterally.         Lab Results   Component Value Date    WBC 6.6 04/04/2023    HGB 14.3 04/04/2023    HCT 44.2 (H) 04/04/2023    MCV 87.4 04/04/2023     04/04/2023       Chemistry    Lab Results   Component Value Date/Time     01/10/2024 0926    K 4.6 01/10/2024 0926     01/10/2024 0926    CO2 28 01/10/2024  0926    BUN 16 01/10/2024 0926    CREATININE 0.80 01/10/2024 0926    Lab Results   Component Value Date/Time    CALCIUM 10.0 01/10/2024 0926    ALKPHOS 99 01/10/2024 0926    AST 19 01/10/2024 0926    ALT 19 01/10/2024 0926    BILITOT <0.2 01/10/2024 0926              Imaging:    Narrative & Impression   PROCEDURE:         BREAST 3D CECE BI DIAG - IMM  5086  REASON FOR EXAM: C50.411     RESULT: MRN: [id number]  Patient Name: REBECA NIX     STUDY:  BREAST 3D CECE BI DIAG; 9/15/2023 8:23 am     INDICATION:  C50.411; /history of right breast cancer/lumpectomy January 2023     COMPARISON:  Multiple/latest 01/05/2023 and 04/02/2022     ACCESSION NUMBER(S):  [id number]     ORDERING CLINICIAN:  KAREN LANTIGUA     TECHNIQUE:  Multiple views of the bilateral breasts were obtained. Computer-aided  detection (CAD) was utilized. 3-D Tomosynthesis was utilized for this  examination with tomosynthesis images obtained in both the CC and MLO  projections.     FINDINGS:  There are scattered fibroglandular densities.     Postlumpectomy changes are seen within the upper-outer quadrant of the right  breast. Areas of fat necrosis can be seen. Postsurgical changes are seen  within the right axilla. Skin thickening of the right breast likely relate  to postradiation changes.     Benign calcifications of the breasts are noted bilaterally. Benign-appearing  densities of the left breast are noted.     There are no masses, pathologic microcalcifications, or other secondary  signs of breast carcinoma.     IMPRESSION:  BI-RADS CATEGORY 2- Benign Findings. Follow-up per schedule.     Assessment/Plan    Rebeca is a 63 yo woman with a hx of [id number] right IDC diagnosed in November 2022. She is s/p partial mastectomy, XRT, and is currently on anastrozole and Zometa. She has good tolerance of anastrozole though had severe side effects with her first dose of Zometa. There is no evidence of recurrent disease on today's exam.   Plan:  Exam is  negative.  Continue anastrozole 1mg daily.  Will add IVF to her next dose of Zometa to help off-set side effects. Rebeca will continue to take ibuprofen/tylenol for pain/discomfort. If she continues to have severe side effects we will discuss continuing on.  Encouraged monthly breast self exams, plant based diet, keep alcohol <3 drinks/week, exercise at least 2.5 hours/week.  We reviewed signs/symptoms of recurrence including new masses, new pigmented lesion, tugging or pulling of the skin, nipple discharge, rash in or around the chest area, or any new finding that doesn't resolve within a 2-3 weeks.  All of Rebeca's questions/concerns were addressed.  Over 30 minutes of time was spent with this patient with >50% of the time with education, counseling, and coordination of care.   Referral to dermatology for skin check.  I will see her back in January. She will call with any concerns. She will see surgical and radiation oncology between our visits.    Diagnoses and all orders for this visit:  Malignant neoplasm of upper-outer quadrant of right breast in female, estrogen receptor positive (CMS/HCC)  -     Referral to Dermatology  -     Clinic Appointment Request Follow Up; [facility]; Future  History of right breast cancer  -     Clinic Appointment Request Follow Up; [facility]  -     anastrozole (Arimidex) 1 mg tablet; Take 1 tablet (1 mg total) by mouth once daily.  Swallow whole with a drink of water.  Encounter for monitoring aromatase inhibitor therapy  -     Referral to Dermatology  -     Clinic Appointment Request Follow Up; [facility]; Future        NIEVES Garzon-CNP

## 2024-03-21 DIAGNOSIS — F32.A DEPRESSION, UNSPECIFIED DEPRESSION TYPE: ICD-10-CM

## 2024-03-22 RX ORDER — SERTRALINE HYDROCHLORIDE 100 MG/1
100 TABLET, FILM COATED ORAL DAILY
Qty: 30 TABLET | Refills: 0 | Status: SHIPPED | OUTPATIENT
Start: 2024-03-22 | End: 2024-03-27 | Stop reason: SDUPTHER

## 2024-03-26 ENCOUNTER — APPOINTMENT (OUTPATIENT)
Dept: HEMATOLOGY/ONCOLOGY | Facility: HOSPITAL | Age: 64
End: 2024-03-26
Payer: COMMERCIAL

## 2024-03-27 ENCOUNTER — OFFICE VISIT (OUTPATIENT)
Dept: PRIMARY CARE | Facility: CLINIC | Age: 64
End: 2024-03-27
Payer: COMMERCIAL

## 2024-03-27 VITALS
HEART RATE: 83 BPM | DIASTOLIC BLOOD PRESSURE: 82 MMHG | WEIGHT: 170.4 LBS | RESPIRATION RATE: 18 BRPM | TEMPERATURE: 97.7 F | OXYGEN SATURATION: 97 % | SYSTOLIC BLOOD PRESSURE: 126 MMHG | BODY MASS INDEX: 28.39 KG/M2 | HEIGHT: 65 IN

## 2024-03-27 DIAGNOSIS — E55.9 VITAMIN D DEFICIENCY: ICD-10-CM

## 2024-03-27 DIAGNOSIS — R53.83 OTHER FATIGUE: ICD-10-CM

## 2024-03-27 DIAGNOSIS — F41.9 ANXIETY: Primary | ICD-10-CM

## 2024-03-27 DIAGNOSIS — F32.A DEPRESSION, UNSPECIFIED DEPRESSION TYPE: ICD-10-CM

## 2024-03-27 DIAGNOSIS — Z13.220 SCREENING, LIPID: ICD-10-CM

## 2024-03-27 DIAGNOSIS — M79.642 HAND PAIN, LEFT: ICD-10-CM

## 2024-03-27 DIAGNOSIS — M25.542 ARTHRALGIA OF LEFT HAND: ICD-10-CM

## 2024-03-27 DIAGNOSIS — Z00.00 WELL ADULT EXAM: ICD-10-CM

## 2024-03-27 PROCEDURE — 80061 LIPID PANEL: CPT | Performed by: NURSE PRACTITIONER

## 2024-03-27 PROCEDURE — 1036F TOBACCO NON-USER: CPT | Performed by: NURSE PRACTITIONER

## 2024-03-27 PROCEDURE — 84443 ASSAY THYROID STIM HORMONE: CPT | Performed by: NURSE PRACTITIONER

## 2024-03-27 PROCEDURE — 82306 VITAMIN D 25 HYDROXY: CPT | Performed by: NURSE PRACTITIONER

## 2024-03-27 PROCEDURE — 85025 COMPLETE CBC W/AUTO DIFF WBC: CPT | Performed by: NURSE PRACTITIONER

## 2024-03-27 PROCEDURE — 36415 COLL VENOUS BLD VENIPUNCTURE: CPT | Performed by: NURSE PRACTITIONER

## 2024-03-27 PROCEDURE — 85652 RBC SED RATE AUTOMATED: CPT | Performed by: NURSE PRACTITIONER

## 2024-03-27 PROCEDURE — 86431 RHEUMATOID FACTOR QUANT: CPT | Mod: WESLAB | Performed by: NURSE PRACTITIONER

## 2024-03-27 PROCEDURE — 86038 ANTINUCLEAR ANTIBODIES: CPT | Mod: WESLAB | Performed by: NURSE PRACTITIONER

## 2024-03-27 PROCEDURE — 84550 ASSAY OF BLOOD/URIC ACID: CPT | Performed by: NURSE PRACTITIONER

## 2024-03-27 PROCEDURE — 80053 COMPREHEN METABOLIC PANEL: CPT | Performed by: NURSE PRACTITIONER

## 2024-03-27 PROCEDURE — 99396 PREV VISIT EST AGE 40-64: CPT | Performed by: NURSE PRACTITIONER

## 2024-03-27 RX ORDER — SERTRALINE HYDROCHLORIDE 100 MG/1
100 TABLET, FILM COATED ORAL DAILY
Qty: 30 TABLET | Refills: 0 | Status: SHIPPED | OUTPATIENT
Start: 2024-03-27

## 2024-03-27 RX ORDER — INDOMETHACIN 50 MG/1
50 CAPSULE ORAL
Qty: 15 CAPSULE | Refills: 0 | Status: SHIPPED | OUTPATIENT
Start: 2024-03-27 | End: 2024-04-01

## 2024-03-27 ASSESSMENT — LIFESTYLE VARIABLES
SKIP TO QUESTIONS 9-10: 1
HOW MANY STANDARD DRINKS CONTAINING ALCOHOL DO YOU HAVE ON A TYPICAL DAY: PATIENT DOES NOT DRINK
AUDIT-C TOTAL SCORE: 0
HOW OFTEN DO YOU HAVE A DRINK CONTAINING ALCOHOL: NEVER
HOW OFTEN DO YOU HAVE SIX OR MORE DRINKS ON ONE OCCASION: NEVER

## 2024-03-27 ASSESSMENT — ENCOUNTER SYMPTOMS
FATIGUE: 1
NUMBNESS: 0
JOINT SWELLING: 1
ARTHRALGIAS: 1
TINGLING: 1

## 2024-03-27 ASSESSMENT — PATIENT HEALTH QUESTIONNAIRE - PHQ9
2. FEELING DOWN, DEPRESSED OR HOPELESS: NOT AT ALL
1. LITTLE INTEREST OR PLEASURE IN DOING THINGS: NOT AT ALL
SUM OF ALL RESPONSES TO PHQ9 QUESTIONS 1 AND 2: 0

## 2024-03-27 ASSESSMENT — PAIN SCALES - GENERAL: PAINLEVEL: 3

## 2024-03-27 NOTE — PROGRESS NOTES
"Subjective   Patient ID: Rebeca Ko is a 64 y.o. female who presents for Med Management (PT IS HERE FOR MEDICATION REFILLS. ) and Hand Pain (PT C/O LEFT THUMB PAIN X 2 MONTHS ).    PT HERE FOR MED REFILLS. LFT HAND PAIN FOR A COUPLE MONTHS PT IS A  .HAS HAD A HARD 3 YRS, LOST MOTHER IN LAW FATHER IN LAW, HAD BREAST CANCER, GOING THROUGH A LOT, DAUGHTER MOVED OUT    Hand Pain   The incident occurred more than 1 week ago. There was no injury mechanism. The quality of the pain is described as aching, stabbing and shooting. The pain radiates to the left arm. The pain is at a severity of 6/10. The pain is moderate. The pain has been Worsening since the incident. Associated symptoms include tingling. Pertinent negatives include no numbness. The symptoms are aggravated by movement. She has tried acetaminophen and NSAIDs for the symptoms. The treatment provided no relief.        Review of Systems   Constitutional:  Positive for fatigue.   Musculoskeletal:  Positive for arthralgias and joint swelling.   Neurological:  Positive for tingling. Negative for numbness.   Psychiatric/Behavioral:          ANXIETY        Objective   /82   Pulse 83   Temp 36.5 °C (97.7 °F)   Resp 18   Ht 1.651 m (5' 5\")   Wt 77.3 kg (170 lb 6.4 oz)   SpO2 97%   BMI 28.36 kg/m²     Physical Exam  Constitutional:       Appearance: Normal appearance.   HENT:      Right Ear: Tympanic membrane normal.      Left Ear: Tympanic membrane normal.      Nose: Nose normal.      Mouth/Throat:      Mouth: Mucous membranes are moist.   Eyes:      Pupils: Pupils are equal, round, and reactive to light.   Cardiovascular:      Rate and Rhythm: Normal rate and regular rhythm.      Pulses: Normal pulses.      Heart sounds: Normal heart sounds.   Pulmonary:      Effort: Pulmonary effort is normal.   Abdominal:      General: Bowel sounds are normal.      Palpations: Abdomen is soft.   Musculoskeletal:         General: Normal range of motion.      " Cervical back: Normal range of motion.   Skin:     General: Skin is warm and dry.   Neurological:      Mental Status: She is alert and oriented to person, place, and time.   Psychiatric:         Behavior: Behavior normal.       Assessment/Plan   Problem List Items Addressed This Visit             ICD-10-CM    Anxiety - Primary F41.9    Depressed F32.A    Vitamin D deficiency E55.9     Other Visit Diagnoses         Codes    Hand pain, left     M79.642    Well adult exam     Z00.00    Other fatigue     R53.83

## 2024-03-28 LAB
25(OH)D3 SERPL-MCNC: 68 NG/ML (ref 31–100)
ALBUMIN SERPL-MCNC: 4.7 G/DL (ref 3.5–5)
ALP BLD-CCNC: 85 U/L (ref 35–125)
ALT SERPL-CCNC: 18 U/L (ref 5–40)
ANION GAP SERPL CALC-SCNC: 10 MMOL/L
AST SERPL-CCNC: 19 U/L (ref 5–40)
BASOPHILS # BLD AUTO: 0.05 X10*3/UL (ref 0–0.1)
BASOPHILS NFR BLD AUTO: 0.8 %
BILIRUB SERPL-MCNC: <0.2 MG/DL (ref 0.1–1.2)
BUN SERPL-MCNC: 17 MG/DL (ref 8–25)
CALCIUM SERPL-MCNC: 9.8 MG/DL (ref 8.5–10.4)
CHLORIDE SERPL-SCNC: 103 MMOL/L (ref 97–107)
CHOLEST SERPL-MCNC: 280 MG/DL (ref 133–200)
CHOLEST/HDLC SERPL: 4.4 {RATIO}
CO2 SERPL-SCNC: 29 MMOL/L (ref 24–31)
CREAT SERPL-MCNC: 0.8 MG/DL (ref 0.4–1.6)
EGFRCR SERPLBLD CKD-EPI 2021: 83 ML/MIN/1.73M*2
EOSINOPHIL # BLD AUTO: 0.12 X10*3/UL (ref 0–0.7)
EOSINOPHIL NFR BLD AUTO: 1.9 %
ERYTHROCYTE [DISTWIDTH] IN BLOOD BY AUTOMATED COUNT: 13.5 % (ref 11.5–14.5)
ERYTHROCYTE [SEDIMENTATION RATE] IN BLOOD BY WESTERGREN METHOD: 17 MM/H (ref 0–30)
GLUCOSE SERPL-MCNC: 93 MG/DL (ref 65–99)
HCT VFR BLD AUTO: 44.1 % (ref 36–46)
HDLC SERPL-MCNC: 64 MG/DL
HGB BLD-MCNC: 14.1 G/DL (ref 12–16)
IMM GRANULOCYTES # BLD AUTO: 0.01 X10*3/UL (ref 0–0.7)
IMM GRANULOCYTES NFR BLD AUTO: 0.2 % (ref 0–0.9)
LDLC SERPL CALC-MCNC: 180 MG/DL (ref 65–130)
LYMPHOCYTES # BLD AUTO: 2.56 X10*3/UL (ref 1.2–4.8)
LYMPHOCYTES NFR BLD AUTO: 40.2 %
MCH RBC QN AUTO: 27.9 PG (ref 26–34)
MCHC RBC AUTO-ENTMCNC: 32 G/DL (ref 32–36)
MCV RBC AUTO: 87 FL (ref 80–100)
MONOCYTES # BLD AUTO: 0.44 X10*3/UL (ref 0.1–1)
MONOCYTES NFR BLD AUTO: 6.9 %
NEUTROPHILS # BLD AUTO: 3.19 X10*3/UL (ref 1.2–7.7)
NEUTROPHILS NFR BLD AUTO: 50 %
NRBC BLD-RTO: 0 /100 WBCS (ref 0–0)
PLATELET # BLD AUTO: 334 X10*3/UL (ref 150–450)
POTASSIUM SERPL-SCNC: 5.1 MMOL/L (ref 3.4–5.1)
PROT SERPL-MCNC: 7.2 G/DL (ref 5.9–7.9)
RBC # BLD AUTO: 5.06 X10*6/UL (ref 4–5.2)
RHEUMATOID FACT SER NEPH-ACNC: <10 IU/ML (ref 0–15)
SODIUM SERPL-SCNC: 142 MMOL/L (ref 133–145)
TRIGL SERPL-MCNC: 182 MG/DL (ref 40–150)
TSH SERPL DL<=0.05 MIU/L-ACNC: 1.68 MIU/L (ref 0.27–4.2)
URATE SERPL-MCNC: 4.1 MG/DL (ref 2.5–6.8)
WBC # BLD AUTO: 6.4 X10*3/UL (ref 4.4–11.3)

## 2024-03-29 LAB
ANA PATTERN: ABNORMAL
ANA SER QL HEP2 SUBST: POSITIVE
ANA TITR SER IF: ABNORMAL {TITER}

## 2024-04-01 ENCOUNTER — TELEPHONE (OUTPATIENT)
Dept: PRIMARY CARE | Facility: CLINIC | Age: 64
End: 2024-04-01
Payer: COMMERCIAL

## 2024-04-01 DIAGNOSIS — R76.8 POSITIVE ANA (ANTINUCLEAR ANTIBODY): ICD-10-CM

## 2024-04-09 ENCOUNTER — APPOINTMENT (OUTPATIENT)
Dept: HEMATOLOGY/ONCOLOGY | Facility: HOSPITAL | Age: 64
End: 2024-04-09
Payer: COMMERCIAL

## 2024-04-09 RX ORDER — SODIUM CHLORIDE 9 MG/ML
1000 INJECTION, SOLUTION INTRAVENOUS ONCE
Start: 2024-06-23 | End: 2024-06-23

## 2024-04-10 ENCOUNTER — OFFICE VISIT (OUTPATIENT)
Dept: NEUROLOGY | Facility: CLINIC | Age: 64
End: 2024-04-10
Payer: COMMERCIAL

## 2024-04-10 VITALS
WEIGHT: 170 LBS | HEIGHT: 64 IN | DIASTOLIC BLOOD PRESSURE: 70 MMHG | HEART RATE: 64 BPM | RESPIRATION RATE: 12 BRPM | BODY MASS INDEX: 29.02 KG/M2 | SYSTOLIC BLOOD PRESSURE: 110 MMHG

## 2024-04-10 DIAGNOSIS — M79.642 HAND PAIN, LEFT: ICD-10-CM

## 2024-04-10 DIAGNOSIS — R20.0 NUMBNESS: ICD-10-CM

## 2024-04-10 DIAGNOSIS — M79.602 PAIN OF LEFT UPPER EXTREMITY: Primary | ICD-10-CM

## 2024-04-10 PROCEDURE — 99204 OFFICE O/P NEW MOD 45 MIN: CPT | Performed by: PSYCHIATRY & NEUROLOGY

## 2024-04-10 PROCEDURE — 1036F TOBACCO NON-USER: CPT | Performed by: PSYCHIATRY & NEUROLOGY

## 2024-04-10 NOTE — LETTER
"April 10, 2024     Mamta Feliz, NIEVES-CNP  [address]  [address]  [place] [facility] [address]    Patient: Rebeca Ko   YOB: 1960   Date of Visit: 4/10/2024       Dear Dr. Mamta Feliz, NIEVES-CNP:    Thank you for referring Rebeca Ko to me for evaluation. Below are my notes for this consultation.  If you have questions, please do not hesitate to call me. I look forward to following your patient along with you.       Sincerely,     Franklin Ridley MD      CC: No Recipients  ______________________________________________________________________________________    Chief complaint:    64 year old woman with left hand pain and numbness for several months.  Consulted by Heena Feliz CNP.    HPI:    This is a 64-year-old woman with left hand pain and numbness for several months.  The patient reports pain and numbness, sometimes tingling, in the left hand.  She is left-hand dominant.  No particular trauma but she is a  and works with her hands all day long.  Right hand seems to be okay.  She is experiencing a trigger thumb problem and believes she also has carpal tunnel syndrome for some years.  She has been using a splint off-and-on.  No history of carpal tunnel surgery or injections.  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  She had a recent visit with her primary care nurse practitioner Heena Feliz and a neurological consultation and EMG test were recommended to get it checked out.    Past medical history is remarkable for 1.  History of breast cancer, right side, had a lumpectomy and radiation therapy about a year and a half ago, on anastrozole, 2.  Anxiety depression, 3.  Osteoporosis.    Current medications reviewed.  Allergies reviewed.    Social history the patient is .  She works as a .  Does not smoke or drink.  Family history negative for neurological conditions.    /70   Pulse 64   Resp 12   Ht 1.626 m (5' 4\")   Wt 77.1 kg (170 lb)   BMI 29.18 " kg/m²     Neurologic Exam     Mental Status   Oriented to person, place, and time.   Level of consciousness: alert    Cranial Nerves   Cranial nerves II through XII intact.     Motor Exam   Muscle bulk: normal  Overall muscle tone: normal    Strength   Strength 5/5 except as noted.   No APB atrophy.     Sensory Exam   Light touch normal.   Tinel's neg at wrists.       Gait, Coordination, and Reflexes     Gait  Gait: normal    Coordination   Romberg: negative    Reflexes   Reflexes 2+ except as noted.   Right plantar: normal  Left plantar: normal     I reviewed the following data on the patient:  Reviewed notes from nurse practitioner Trini in the electronic medical record.    Assessment and Plan:  In summary, this patient is experiencing left hand pain and numbness.  We reviewed the matter.  We will proceed with EMG nerve conduction studies of the left upper extremity to investigate nerve function, possibilities include carpal tunnel syndrome versus ulnar neuropathy or less likely radiculopathy.  We will see what the nerve test shows and go from there.  Everything was reviewed in detail with the patient and she understands and is comfortable with this plan.  All of her questions were answered.  Once again thank you very much nurse practitioner Trini for allowing me to be a part of the care of your patient.  [id number]/TC>50%      Franklin Ridley MD

## 2024-04-10 NOTE — PROGRESS NOTES
"Chief complaint:    64 year old woman with left hand pain and numbness for several months.  Consulted by Heena Feliz CNP.    HPI:    This is a 64-year-old woman with left hand pain and numbness for several months.  The patient reports pain and numbness, sometimes tingling, in the left hand.  She is left-hand dominant.  No particular trauma but she is a  and works with her hands all day long.  Right hand seems to be okay.  She is experiencing a trigger thumb problem and believes she also has carpal tunnel syndrome for some years.  She has been using a splint off-and-on.  No history of carpal tunnel surgery or injections.  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  She had a recent visit with her primary care nurse practitioner Heena Feliz and a neurological consultation and EMG test were recommended to get it checked out.    Past medical history is remarkable for 1.  History of breast cancer, right side, had a lumpectomy and radiation therapy about a year and a half ago, on anastrozole, 2.  Anxiety depression, 3.  Osteoporosis.    Current medications reviewed.  Allergies reviewed.    Social history the patient is .  She works as a .  Does not smoke or drink.  Family history negative for neurological conditions.    /70   Pulse 64   Resp 12   Ht 1.626 m (5' 4\")   Wt 77.1 kg (170 lb)   BMI 29.18 kg/m²     Neurologic Exam     Mental Status   Oriented to person, place, and time.   Level of consciousness: alert    Cranial Nerves   Cranial nerves II through XII intact.     Motor Exam   Muscle bulk: normal  Overall muscle tone: normal    Strength   Strength 5/5 except as noted.   No APB atrophy.     Sensory Exam   Light touch normal.   Tinel's neg at wrists.       Gait, Coordination, and Reflexes     Gait  Gait: normal    Coordination   Romberg: negative    Reflexes   Reflexes 2+ except as noted.   Right plantar: normal  Left plantar: normal     I reviewed the following data on the " patient:  Reviewed notes from nurse practitioner Trini in the electronic medical record.    Assessment and Plan:  In summary, this patient is experiencing left hand pain and numbness.  We reviewed the matter.  We will proceed with EMG nerve conduction studies of the left upper extremity to investigate nerve function, possibilities include carpal tunnel syndrome versus ulnar neuropathy or less likely radiculopathy.  We will see what the nerve test shows and go from there.  Everything was reviewed in detail with the patient and she understands and is comfortable with this plan.  All of her questions were answered.  Once again thank you very much nurse practitioner Trini for allowing me to be a part of the care of your patient.  [id number]/TC>50%      Franklin Ridley MD

## 2024-04-11 ENCOUNTER — ANCILLARY PROCEDURE (OUTPATIENT)
Dept: NEUROLOGY | Facility: CLINIC | Age: 64
End: 2024-04-11
Payer: COMMERCIAL

## 2024-04-11 DIAGNOSIS — R20.0 NUMBNESS: ICD-10-CM

## 2024-04-11 DIAGNOSIS — M79.602 PAIN OF LEFT UPPER EXTREMITY: ICD-10-CM

## 2024-04-11 PROCEDURE — 95886 MUSC TEST DONE W/N TEST COMP: CPT | Performed by: PSYCHIATRY & NEUROLOGY

## 2024-04-11 PROCEDURE — 95911 NRV CNDJ TEST 9-10 STUDIES: CPT | Performed by: PSYCHIATRY & NEUROLOGY

## 2024-04-11 NOTE — PROGRESS NOTES
Nerve conduction studies and needle EMG was performed today on this patient.  Full scanned report is available in the Procedure tab in Epic (Chart Review, Procedure tab, double-click the report to enlarge it).  Note:  Dr. Ridley remains available to perform EMG studies until 2024, when he will be retiring.    Impression:  Nerve conduction study and needle examination of left upper extremity were performed, see details in table.  The results were abnormal because of the followin. Prolonged median sensory and median motor latencies.  Depressed median sensory and median motor amplitudes with denervation in the left APB muscle.  The results are consistent with a left median neuropathy at the wrist (carpal tunnel syndrome), moderate in degree.  Consultation with a hand surgeon is already scheduled for the patient.      Franklin Ridley MD

## 2024-04-15 ENCOUNTER — HOSPITAL ENCOUNTER (OUTPATIENT)
Dept: RADIOLOGY | Facility: HOSPITAL | Age: 64
Discharge: HOME | End: 2024-04-15
Payer: COMMERCIAL

## 2024-04-15 DIAGNOSIS — M79.642 HAND PAIN, LEFT: ICD-10-CM

## 2024-04-15 PROCEDURE — 73140 X-RAY EXAM OF FINGER(S): CPT | Mod: LT

## 2024-04-15 PROCEDURE — 73140 X-RAY EXAM OF FINGER(S): CPT | Mod: LEFT SIDE | Performed by: RADIOLOGY

## 2024-04-16 ENCOUNTER — OFFICE VISIT (OUTPATIENT)
Dept: ORTHOPEDIC SURGERY | Facility: CLINIC | Age: 64
End: 2024-04-16
Payer: COMMERCIAL

## 2024-04-16 ENCOUNTER — APPOINTMENT (OUTPATIENT)
Dept: RADIOLOGY | Facility: HOSPITAL | Age: 64
End: 2024-04-16
Payer: COMMERCIAL

## 2024-04-16 DIAGNOSIS — M79.642 HAND PAIN, LEFT: ICD-10-CM

## 2024-04-16 DIAGNOSIS — G56.02 LEFT CARPAL TUNNEL SYNDROME: Primary | ICD-10-CM

## 2024-04-16 DIAGNOSIS — M65.312 TRIGGER FINGER OF LEFT THUMB: ICD-10-CM

## 2024-04-16 PROCEDURE — 1036F TOBACCO NON-USER: CPT | Performed by: ORTHOPAEDIC SURGERY

## 2024-04-16 PROCEDURE — 99213 OFFICE O/P EST LOW 20 MIN: CPT | Performed by: ORTHOPAEDIC SURGERY

## 2024-04-16 PROCEDURE — 2500000005 HC RX 250 GENERAL PHARMACY W/O HCPCS: Performed by: ORTHOPAEDIC SURGERY

## 2024-04-16 PROCEDURE — 99203 OFFICE O/P NEW LOW 30 MIN: CPT | Performed by: ORTHOPAEDIC SURGERY

## 2024-04-16 PROCEDURE — 2500000004 HC RX 250 GENERAL PHARMACY W/ HCPCS (ALT 636 FOR OP/ED): Performed by: ORTHOPAEDIC SURGERY

## 2024-04-16 RX ORDER — CEFAZOLIN SODIUM 2 G/100ML
2 INJECTION, SOLUTION INTRAVENOUS ONCE
OUTPATIENT
Start: 2024-04-16 | End: 2024-04-16

## 2024-04-16 RX ORDER — SODIUM CHLORIDE, SODIUM LACTATE, POTASSIUM CHLORIDE, CALCIUM CHLORIDE 600; 310; 30; 20 MG/100ML; MG/100ML; MG/100ML; MG/100ML
100 INJECTION, SOLUTION INTRAVENOUS CONTINUOUS
OUTPATIENT
Start: 2024-04-16

## 2024-04-16 ASSESSMENT — PAIN SCALES - GENERAL: PAINLEVEL_OUTOF10: 3

## 2024-04-16 ASSESSMENT — PAIN - FUNCTIONAL ASSESSMENT: PAIN_FUNCTIONAL_ASSESSMENT: 0-10

## 2024-04-16 NOTE — PROGRESS NOTES
Reason for Appointment  Chief Complaint   Patient presents with    Left Hand - Pain, Numbness     History of Present Illness  New patient is a 64 y.o. female here today for evaluation of left hand pain and numbness. X-rays of the left hand show mild scattered DJD. EMG 4/11/24 shows moderate left carpal tunnel syndrome.  She has numbness and tingling in the fingers and symptoms will wake her up at night.  She has also noticed clicking and catching of the left thumb for the last few months.  She is a  and does a lot of repetitive activity with her hands.  She does have a wrist brace that she wears at night.    Past Medical History:   Diagnosis Date    Cancer (Multi) Nov 2022    Depression Feb 2017? Or earlier       Past Surgical History:   Procedure Laterality Date    BI MAMMO GUIDED LOCALIZATION BREAST RIGHT Right 01/05/2023    BI MAMMO GUIDED LOCALIZATION BREAST RIGHT LAK SURG [facility]    BI US GUIDED BREAST LOCALIZATION AND BIOPSY RIGHT Right 11/16/2022    BI US GUIDED BREAST LOCALIZATION AND BIOPSY RIGHT [facility]    BREAST SURGERY      HYSTERECTOMY  Nov 2003    LYMPH NODE BIOPSY      TONSILLECTOMY         Medication Documentation Review Audit       Reviewed by Shari Angela MA (Medical Assistant) on 04/16/24 at 1356      Medication Order Taking? Sig Documenting Provider Last Dose Status   anastrozole (Arimidex) 1 mg tablet [id number] Yes Take 1 tablet (1 mg total) by mouth once daily.  Swallow whole with a drink of water. NIEVES Garzon-CNP Taking Active   ascorbic acid (Vitamin C) 500 mg tablet [id number] Yes Take 1 tablet (500 mg) by mouth once daily. Historical Provider, MD Taking Active   calcium citrate/vitamin D3 (CITRACAL PLUS D ORAL) [id number] Yes Take 2 tablets by mouth once daily. Historical Provider, MD Taking Active   cholecalciferol (Vitamin D-3) 50 MCG (2000 UT) tablet [id number] Yes Take 2 tablets (4,000 Units) by mouth once daily. Historical Provider, MD Taking Active    glucosam-msm-chond-hrb149-hyal 500-500-66.7 mg tablet [id number] Yes Take 1 tablet by mouth once daily. Historical Provider, MD Taking Active   multivit-minerals/FA/lycopene (ONE DAILY ORAL) [id number] Yes 1 tablet Orally Once a day for 30 day(s) Historical Provider, MD Taking Active   omega 3-dha-epa-fish oil (Fish OiL) 1,200 (144-216) mg capsule [id number] Yes Take 1 capsule (1,200 mg) by mouth once daily. Historical Provider, MD Taking Active   red yeast rice 600 mg capsule [id number] Yes Take by mouth. Historical Provider, MD Taking Active   sertraline (Zoloft) 100 mg tablet [id number] Yes Take 1 tablet (100 mg) by mouth once daily. NIEVES Wren-CNP Taking Active   ubidecarenone (coenzyme Q-10) 60 mg capsule [id number] Yes as directed Orally Historical Provider, MD Taking Active                    No Known Allergies    Review of Systems   Constitutional:  Negative for chills and fever.   HENT:  Negative for hearing loss and trouble swallowing.    Eyes:  Negative for discharge.   Respiratory:  Negative for shortness of breath and wheezing.    Cardiovascular:  Negative for chest pain.   Musculoskeletal:  Positive for joint swelling.   Skin:  Negative for pallor and rash.   All other systems reviewed and are negative.    Exam   On exam patient is alert, awake, and in no acute distress.  Head is normocephalic, no JVD, no auditory wheezes.  She has good shoulder and elbow motion, mild DJD in the hands but no severe atrophy.  She has a positive Tinel's over the nerve and positive median nerve compression test on the left.  Active triggering of the left thumb and tenderness over the left thumb A1 pulley tendon sheath.  Decent motion of the other digits with no triggering.  Good pulses and decree sensation to light touch in the median nerve distribution. Skin is warm and dry without ulcerations, no other swelling or lymphadenopathy.    Assessment   Encounter Diagnosis   Name Primary?    Hand pain, left Yes   Left  carpal tunnel syndrome  Left thumb trigger finger    Plan   We discussed operative versus conservative treatment today.  She has has a significant left thumb trigger finger as well as carpal tunnel syndrome.  She would like an injection today into the thumb and then we discussed surgical release of both the thumb and carpal tunnel later this year when she is not as busy at work.  We sterilely injected under ultrasound guidance Depo-Medrol lidocaine into the left thumb A1 pulley tendon sheath.  Patient understands the small risk of infection and the signs to look for as well as flare reaction.  Hopefully this gives her good relief.  She understands the risks of surge including nerve, artery, tendon damage, infection, continued pain, need for future surgery.  She will call and schedule a left carpal tunnel release and a left thumb trigger release over the summer.    Hand / UE Inj/Asp: L thumb A1 for trigger finger on 4/17/2024 7:55 AM  Indications: pain  Details: 25 G needle, ultrasound-guided  Medications: 1 mL lidocaine 10 mg/mL (1 %); 20 mg methylPREDNISolone acetate 40 mg/mL  Outcome: tolerated well, no immediate complications    After discussing the risks and benefits of the procedure we proceeded with the injection. Under ultrasound guidance we identified the metacarpal bone and overlying tendon sheath with the FDS and FDP tendons, images obtained. We then sterilely injected the left thumb A1 pulley with a mixture of 20 mg of DepoMedrol and .5 cc of 1% lidocaine. Pt tolerated the procedure well without any adverse reactions.    Procedure, treatment alternatives, risks and benefits explained, specific risks discussed. Consent was given by the patient. Immediately prior to procedure a time out was called to verify the correct patient, procedure, equipment, support staff and site/side marked as required. Patient was prepped and draped in the usual sterile fashion.       Written by Reny Cannon  saw, evaluated, and treated the patient with the PA

## 2024-04-17 PROCEDURE — 20550 NJX 1 TENDON SHEATH/LIGAMENT: CPT | Performed by: ORTHOPAEDIC SURGERY

## 2024-04-17 PROCEDURE — 76942 ECHO GUIDE FOR BIOPSY: CPT | Performed by: ORTHOPAEDIC SURGERY

## 2024-04-17 RX ORDER — METHYLPREDNISOLONE ACETATE 40 MG/ML
20 INJECTION, SUSPENSION INTRA-ARTICULAR; INTRALESIONAL; INTRAMUSCULAR; SOFT TISSUE
Status: COMPLETED | OUTPATIENT
Start: 2024-04-17 | End: 2024-04-17

## 2024-04-17 RX ORDER — LIDOCAINE HYDROCHLORIDE 10 MG/ML
1 INJECTION INFILTRATION; PERINEURAL
Status: COMPLETED | OUTPATIENT
Start: 2024-04-17 | End: 2024-04-17

## 2024-04-17 RX ADMIN — LIDOCAINE HYDROCHLORIDE 1 ML: 10 INJECTION, SOLUTION INFILTRATION; PERINEURAL at 07:55

## 2024-04-17 RX ADMIN — METHYLPREDNISOLONE ACETATE 20 MG: 40 INJECTION, SUSPENSION INTRA-ARTICULAR; INTRALESIONAL; INTRAMUSCULAR; INTRASYNOVIAL; SOFT TISSUE at 07:55

## 2024-04-17 ASSESSMENT — ENCOUNTER SYMPTOMS
TROUBLE SWALLOWING: 0
FEVER: 0
WHEEZING: 0
SHORTNESS OF BREATH: 0
JOINT SWELLING: 1
EYE DISCHARGE: 0
CHILLS: 0

## 2024-05-15 ENCOUNTER — APPOINTMENT (OUTPATIENT)
Dept: RHEUMATOLOGY | Facility: CLINIC | Age: 64
End: 2024-05-15
Payer: COMMERCIAL

## 2024-05-22 PROBLEM — G56.02 LEFT CARPAL TUNNEL SYNDROME: Status: ACTIVE | Noted: 2024-04-16

## 2024-05-22 PROBLEM — M65.312 TRIGGER FINGER OF LEFT THUMB: Status: ACTIVE | Noted: 2024-04-16

## 2024-05-23 ENCOUNTER — PREP FOR PROCEDURE (OUTPATIENT)
Dept: ORTHOPEDIC SURGERY | Facility: HOSPITAL | Age: 64
End: 2024-05-23
Payer: COMMERCIAL

## 2024-05-23 DIAGNOSIS — G56.02 LEFT CARPAL TUNNEL SYNDROME: Primary | ICD-10-CM

## 2024-05-30 ENCOUNTER — TELEPHONE (OUTPATIENT)
Dept: ORTHOPEDIC SURGERY | Facility: CLINIC | Age: 64
End: 2024-05-30
Payer: COMMERCIAL

## 2024-05-30 NOTE — TELEPHONE ENCOUNTER
Patient called, she has a scheduled surgery on 7/31/24. She has post op questions for you like when she can start driving after the surgery. She can be reached at [phone]. Thanks

## 2024-06-25 ENCOUNTER — LAB (OUTPATIENT)
Dept: LAB | Facility: LAB | Age: 64
End: 2024-06-25
Payer: COMMERCIAL

## 2024-06-25 DIAGNOSIS — C50.411 MALIGNANT NEOPLASM OF UPPER-OUTER QUADRANT OF RIGHT BREAST IN FEMALE, ESTROGEN RECEPTOR POSITIVE (MULTI): ICD-10-CM

## 2024-06-25 DIAGNOSIS — Z17.0 MALIGNANT NEOPLASM OF UPPER-OUTER QUADRANT OF RIGHT BREAST IN FEMALE, ESTROGEN RECEPTOR POSITIVE (MULTI): ICD-10-CM

## 2024-06-25 LAB
ALBUMIN SERPL BCP-MCNC: 4.2 G/DL (ref 3.4–5)
ALP SERPL-CCNC: 58 U/L (ref 33–136)
ALT SERPL W P-5'-P-CCNC: 16 U/L (ref 7–45)
ANION GAP SERPL CALC-SCNC: 10 MMOL/L (ref 10–20)
AST SERPL W P-5'-P-CCNC: 19 U/L (ref 9–39)
BILIRUB SERPL-MCNC: 0.4 MG/DL (ref 0–1.2)
BUN SERPL-MCNC: 19 MG/DL (ref 6–23)
CALCIUM SERPL-MCNC: 9.7 MG/DL (ref 8.6–10.3)
CHLORIDE SERPL-SCNC: 103 MMOL/L (ref 98–107)
CO2 SERPL-SCNC: 31 MMOL/L (ref 21–32)
CREAT SERPL-MCNC: 0.86 MG/DL (ref 0.5–1.05)
EGFRCR SERPLBLD CKD-EPI 2021: 76 ML/MIN/1.73M*2
GLUCOSE SERPL-MCNC: 87 MG/DL (ref 74–99)
MAGNESIUM SERPL-MCNC: 2.02 MG/DL (ref 1.6–2.4)
PHOSPHATE SERPL-MCNC: 4.3 MG/DL (ref 2.5–4.9)
POTASSIUM SERPL-SCNC: 4.4 MMOL/L (ref 3.5–5.3)
PROT SERPL-MCNC: 6.7 G/DL (ref 6.4–8.2)
SODIUM SERPL-SCNC: 140 MMOL/L (ref 136–145)

## 2024-06-25 PROCEDURE — 36415 COLL VENOUS BLD VENIPUNCTURE: CPT

## 2024-07-02 ENCOUNTER — APPOINTMENT (OUTPATIENT)
Dept: HEMATOLOGY/ONCOLOGY | Facility: CLINIC | Age: 64
End: 2024-07-02
Payer: COMMERCIAL

## 2024-07-02 DIAGNOSIS — F32.A DEPRESSION, UNSPECIFIED DEPRESSION TYPE: ICD-10-CM

## 2024-07-03 RX ORDER — SERTRALINE HYDROCHLORIDE 100 MG/1
100 TABLET, FILM COATED ORAL DAILY
Qty: 90 TABLET | Refills: 1 | Status: SHIPPED | OUTPATIENT
Start: 2024-07-03

## 2024-07-09 ENCOUNTER — APPOINTMENT (OUTPATIENT)
Dept: SURGERY | Facility: CLINIC | Age: 64
End: 2024-07-09
Payer: COMMERCIAL

## 2024-07-09 ENCOUNTER — INFUSION (OUTPATIENT)
Dept: HEMATOLOGY/ONCOLOGY | Facility: CLINIC | Age: 64
End: 2024-07-09
Payer: COMMERCIAL

## 2024-07-09 VITALS
OXYGEN SATURATION: 97 % | DIASTOLIC BLOOD PRESSURE: 73 MMHG | WEIGHT: 177.36 LBS | BODY MASS INDEX: 30.44 KG/M2 | TEMPERATURE: 97 F | RESPIRATION RATE: 18 BRPM | SYSTOLIC BLOOD PRESSURE: 124 MMHG | HEART RATE: 62 BPM

## 2024-07-09 DIAGNOSIS — C50.411 MALIGNANT NEOPLASM OF UPPER-OUTER QUADRANT OF RIGHT BREAST IN FEMALE, ESTROGEN RECEPTOR POSITIVE (MULTI): ICD-10-CM

## 2024-07-09 DIAGNOSIS — Z17.0 MALIGNANT NEOPLASM OF UPPER-OUTER QUADRANT OF RIGHT BREAST IN FEMALE, ESTROGEN RECEPTOR POSITIVE (MULTI): ICD-10-CM

## 2024-07-09 PROCEDURE — 2500000004 HC RX 250 GENERAL PHARMACY W/ HCPCS (ALT 636 FOR OP/ED): Performed by: NURSE PRACTITIONER

## 2024-07-09 PROCEDURE — 96365 THER/PROPH/DIAG IV INF INIT: CPT | Mod: INF

## 2024-07-09 RX ORDER — ALBUTEROL SULFATE 0.83 MG/ML
3 SOLUTION RESPIRATORY (INHALATION) AS NEEDED
OUTPATIENT
Start: 2024-12-10

## 2024-07-09 RX ORDER — SODIUM CHLORIDE 9 MG/ML
1000 INJECTION, SOLUTION INTRAVENOUS ONCE
Start: 2024-12-10 | End: 2024-12-10

## 2024-07-09 RX ORDER — FAMOTIDINE 10 MG/ML
20 INJECTION INTRAVENOUS ONCE AS NEEDED
OUTPATIENT
Start: 2024-12-10

## 2024-07-09 RX ORDER — FAMOTIDINE 10 MG/ML
20 INJECTION INTRAVENOUS ONCE AS NEEDED
Status: DISCONTINUED | OUTPATIENT
Start: 2024-07-09 | End: 2024-07-09 | Stop reason: HOSPADM

## 2024-07-09 RX ORDER — DIPHENHYDRAMINE HYDROCHLORIDE 50 MG/ML
50 INJECTION INTRAMUSCULAR; INTRAVENOUS AS NEEDED
Status: DISCONTINUED | OUTPATIENT
Start: 2024-07-09 | End: 2024-07-09 | Stop reason: HOSPADM

## 2024-07-09 RX ORDER — EPINEPHRINE 0.3 MG/.3ML
0.3 INJECTION SUBCUTANEOUS EVERY 5 MIN PRN
OUTPATIENT
Start: 2024-12-10

## 2024-07-09 RX ORDER — DIPHENHYDRAMINE HYDROCHLORIDE 50 MG/ML
50 INJECTION INTRAMUSCULAR; INTRAVENOUS AS NEEDED
OUTPATIENT
Start: 2024-12-10

## 2024-07-09 RX ORDER — ZOLEDRONIC ACID 0.04 MG/ML
4 INJECTION, SOLUTION INTRAVENOUS ONCE
Status: COMPLETED | OUTPATIENT
Start: 2024-07-09 | End: 2024-07-09

## 2024-07-09 RX ORDER — EPINEPHRINE 0.3 MG/.3ML
0.3 INJECTION SUBCUTANEOUS EVERY 5 MIN PRN
Status: DISCONTINUED | OUTPATIENT
Start: 2024-07-09 | End: 2024-07-09 | Stop reason: HOSPADM

## 2024-07-09 RX ORDER — ALBUTEROL SULFATE 0.83 MG/ML
3 SOLUTION RESPIRATORY (INHALATION) AS NEEDED
Status: DISCONTINUED | OUTPATIENT
Start: 2024-07-09 | End: 2024-07-09 | Stop reason: HOSPADM

## 2024-07-09 RX ORDER — SODIUM CHLORIDE 9 MG/ML
1000 INJECTION, SOLUTION INTRAVENOUS ONCE
Status: COMPLETED | OUTPATIENT
Start: 2024-07-09 | End: 2024-07-09

## 2024-07-09 ASSESSMENT — PAIN SCALES - GENERAL: PAINLEVEL: 0-NO PAIN

## 2024-07-17 ENCOUNTER — PRE-ADMISSION TESTING (OUTPATIENT)
Dept: PREADMISSION TESTING | Facility: HOSPITAL | Age: 64
End: 2024-07-17
Payer: COMMERCIAL

## 2024-07-17 ENCOUNTER — APPOINTMENT (OUTPATIENT)
Dept: DERMATOLOGY | Facility: CLINIC | Age: 64
End: 2024-07-17
Payer: COMMERCIAL

## 2024-07-17 ENCOUNTER — APPOINTMENT (OUTPATIENT)
Dept: PREADMISSION TESTING | Facility: HOSPITAL | Age: 64
End: 2024-07-17
Payer: COMMERCIAL

## 2024-07-17 VITALS
RESPIRATION RATE: 16 BRPM | SYSTOLIC BLOOD PRESSURE: 113 MMHG | TEMPERATURE: 96.4 F | OXYGEN SATURATION: 98 % | WEIGHT: 175 LBS | HEIGHT: 64 IN | DIASTOLIC BLOOD PRESSURE: 60 MMHG | HEART RATE: 80 BPM | BODY MASS INDEX: 29.88 KG/M2

## 2024-07-17 DIAGNOSIS — L57.9 SKIN CHANGES DUE TO CHRONIC EXPOSURE TO NONIONIZING RADIATION: ICD-10-CM

## 2024-07-17 DIAGNOSIS — D18.01 ANGIOMA OF SKIN: Primary | ICD-10-CM

## 2024-07-17 DIAGNOSIS — D22.9 FIBROUS PAPULE OF SKIN: ICD-10-CM

## 2024-07-17 DIAGNOSIS — G56.02 LEFT CARPAL TUNNEL SYNDROME: ICD-10-CM

## 2024-07-17 DIAGNOSIS — L81.4 LENTIGO: ICD-10-CM

## 2024-07-17 DIAGNOSIS — D22.9 NEVUS: ICD-10-CM

## 2024-07-17 DIAGNOSIS — L82.1 SEBORRHEIC KERATOSIS: ICD-10-CM

## 2024-07-17 PROCEDURE — 99203 OFFICE O/P NEW LOW 30 MIN: CPT | Performed by: NURSE PRACTITIONER

## 2024-07-17 PROCEDURE — 1036F TOBACCO NON-USER: CPT | Performed by: NURSE PRACTITIONER

## 2024-07-17 PROCEDURE — 99203 OFFICE O/P NEW LOW 30 MIN: CPT | Performed by: PHYSICIAN ASSISTANT

## 2024-07-17 ASSESSMENT — DUKE ACTIVITY SCORE INDEX (DASI)
CAN YOU WALK A BLOCK OR TWO ON LEVEL GROUND: YES
CAN YOU DO MODERATE WORK AROUND THE HOUSE LIKE VACUUMING, SWEEPING FLOORS OR CARRYING GROCERIES: YES
CAN YOU DO HEAVY WORK AROUND THE HOUSE LIKE SCRUBBING FLOORS OR LIFTING AND MOVING HEAVY FURNITURE: YES
CAN YOU DO YARD WORK LIKE RAKING LEAVES, WEEDING OR PUSHING A MOWER: YES
TOTAL_SCORE: 58.2
CAN YOU HAVE SEXUAL RELATIONS: YES
DASI METS SCORE: 9.9
CAN YOU CLIMB A FLIGHT OF STAIRS OR WALK UP A HILL: YES
CAN YOU DO LIGHT WORK AROUND THE HOUSE LIKE DUSTING OR WASHING DISHES: YES
CAN YOU PARTICIPATE IN STRENOUS SPORTS LIKE SWIMMING, SINGLES TENNIS, FOOTBALL, BASKETBALL, OR SKIING: YES
CAN YOU TAKE CARE OF YOURSELF (EAT, DRESS, BATHE, OR USE TOILET): YES
CAN YOU RUN A SHORT DISTANCE: YES
CAN YOU WALK INDOORS, SUCH AS AROUND YOUR HOUSE: YES
CAN YOU PARTICIPATE IN MODERATE RECREATIONAL ACTIVITIES LIKE GOLF, BOWLING, DANCING, DOUBLES TENNIS OR THROWING A BASEBALL OR FOOTBALL: YES

## 2024-07-17 ASSESSMENT — ENCOUNTER SYMPTOMS
CARDIOVASCULAR NEGATIVE: 1
RESPIRATORY NEGATIVE: 1
ARTHRALGIAS: 1
CONSTITUTIONAL NEGATIVE: 1
NECK PAIN: 1
EYES NEGATIVE: 1
NEUROLOGICAL NEGATIVE: 1
GASTROINTESTINAL NEGATIVE: 1
ENDOCRINE NEGATIVE: 1
PSYCHIATRIC NEGATIVE: 1

## 2024-07-17 NOTE — PATIENT INSTRUCTIONS

## 2024-07-17 NOTE — PREPROCEDURE INSTRUCTIONS
Medication List            Accurate as of July 17, 2024  9:12 AM. Always use your most recent med list.                anastrozole 1 mg tablet  Commonly known as: Arimidex  Take 1 tablet (1 mg total) by mouth once daily.  Swallow whole with a drink of water.  Medication Adjustments for Surgery: Other (Comment)  Notes to patient: MAY CONTINUE aS NORMAL     cholecalciferol 50 MCG (2000 UT) tablet  Commonly known as: Vitamin D-3  Medication Adjustments for Surgery: Stop 7 days before surgery     CITRACAL PLUS D ORAL  Medication Adjustments for Surgery: Stop 7 days before surgery     coenzyme Q-10 60 mg capsule  Medication Adjustments for Surgery: Stop 7 days before surgery     Fish OiL 1,200 (144-216) mg capsule  Generic drug: omega 3-dha-epa-fish oil  Medication Adjustments for Surgery: Stop 7 days before surgery     glucosam-msm-chond-hrb149-hyal 500-500-66.7 mg tablet  Medication Adjustments for Surgery: Stop 7 days before surgery     ONE DAILY ORAL  Medication Adjustments for Surgery: Stop 7 days before surgery     red yeast rice 600 mg capsule  Medication Adjustments for Surgery: Stop 7 days before surgery     sertraline 100 mg tablet  Commonly known as: Zoloft  TAKE 1 TABLET(100 MG) BY MOUTH DAILY  Medication Adjustments for Surgery: Other (Comment)  Notes to patient: MAY TAKE AT BEDTIME LIKE NORMAL     Vitamin C 500 mg tablet  Generic drug: ascorbic acid  Medication Adjustments for Surgery: Stop 7 days before surgery                              Preoperative Fasting Guidelines    Why must I stop eating and drinking near surgery time?  With sedation, food or liquid in your stomach can enter your lungs causing serious complications  Increases nausea and vomiting    When do I need to stop eating and drinking before my surgery?  Do not eat any food or drink any liquids after midnight the night before your surgery/procedure.  You may have sips of water to take medications.              PAT DISCHARGE  INSTRUCTIONS    Please call the Same Day Surgery (SDS) Department of the hospital where your procedure will be performed after 2:00 PM the day before your surgery. If you are scheduled on a Monday, or a Tuesday following a Monday holiday, you will need to call on the last business day prior to your surgery.    [facility]  [address], [address]  [phone]  [facility]  [address] [place] [address] [phone]  [facility]  [address].  [place] [id number]  [phone]    Please let your surgeon know if:      You develop any open sores, shingles, burning or painful urination as these may increase your risk of an infection.   You no longer wish to have the surgery.   Any other personal circumstances change that may lead to the need to cancel or defer this surgery-such as being sick or getting admitted to any hospital within one week of your planned procedure.    Your contact details change, such as a change of address or phone number.    Starting now:     Please DO NOT drink alcohol or smoke for 24 hours before surgery. It is well known that quitting smoking can make a huge difference to your health and recovery from surgery. The longer you abstain from smoking, the better your chances of a healthy recovery. If you need help with quitting, call 6-800-QUIT-NOW to be connected to a trained counselor who will discuss the best methods to help you quit.     Before your surgery:    Please stop all supplements 7 days prior to surgery. Or as directed by your surgeon.   Please stop taking NSAID pain medicine such as Advil and Motrin 7 days before surgery.    If you develop any fever, cough, cold, rashes, cuts, scratches, scrapes, urinary symptoms or infection anywhere on your body (including teeth and gums) prior to surgery, please call your surgeon’s  office as soon as possible. This may require treatment to reduce the chance of cancellation on the day of surgery.    The day before your surgery:   DIET- Please follow the diet instructions at the top of your packet.   Get a good night’s rest.  Use the special soap for bathing if you have been instructed to use one.    Scheduled surgery times may change and you will be notified if this occurs - please check your personal voicemail for any updates.     On the morning of surgery:   Wear comfortable, loose fitting clothes which open in the front. Please do not wear moisturizers, creams, lotions, makeup or perfume.    Please bring with you to surgery:   Photo ID and insurance card   Current list of medicines and allergies   Pacemaker/ Defibrillator/Heart stent cards   CPAP machine and mask    Slings/ splints/ crutches   A copy of your complete advanced directive/DHPOA.    Please do NOT bring with you to surgery:   All jewelry and valuables should be left at home.   Prosthetic devices such as contact lenses, hearing aids, dentures, eyelash extensions, hairpins and body piercings must be removed prior to going in to the surgical suite.    After outpatient surgery:   A responsible adult MUST accompany you at the time of discharge and stay with you for 24 hours after your surgery. You may NOT drive yourself home after surgery.    Do not drive, operate machinery, make critical decisions or do activities that require co-ordination or balance until after a night’s sleep.   Do not drink alcoholic beverages for 24 hours.   Instructions for resuming your medications will be provided by your surgeon.    CALL YOUR DOCTOR AFTER SURGERY IF YOU HAVE:     Chills and/or a fever of 101° F or higher.    Redness, swelling, pus or drainage from your surgical wound or a bad smell from the wound.    Lightheadedness, fainting or confusion.    Persistent vomiting (throwing up) and are not able to eat or drink for 12 hours.    Three or more  loose, watery bowel movements in 24 hours (diarrhea).   Difficulty or pain while urinating( after non-urological surgery)    Pain and swelling in your legs, especially if it is only on one side.    Difficulty breathing or are breathing faster than normal.    Any new concerning symptoms.

## 2024-07-17 NOTE — H&P (VIEW-ONLY)
"CPM/PAT Evaluation       Name: Rebeca Ko (Rebeca Ko)  /Age: 1960/64 y.o.     In-Person       Chief Complaint: \"wrist and thumb pain\"    HPI  The patient is a 64 year old female.  She is left handed.  Since  she has experienced pain in the left wrist and base of left thumb that can occasionally radiate to the left forearm that can occur at anytime, but especially with gripping.  She has associated left hand swelling and occasional numbness of the left middle finger.  She denies night pain and does not wear a wrist brace.  She was seen by Dr. Cannon in  and a cortisone injection was helpful for 2 months.  Her symptoms are gradually returning and surgical intervention is recommended.    Past Medical History:   Diagnosis Date    Acne     Anxiety     Arthritis     Breast cancer (Multi) 24    COVID-19 Dec 2021    No hospitalization    Depression 2017? Or earlier    Fibroid     Hyperlipidemia     Joint pain     Nephrolithiasis     Personal history of irradiation 3-30-23    Skin tag     Urinary tract infection     Varicella     Verruca        Past Surgical History:   Procedure Laterality Date    BI MAMMO GUIDED BREAST RIGHT LOCALIZATION Right 2023    BI MAMMO GUIDED LOCALIZATION BREAST RIGHT LAK SURG [facility]IB LEGACY    BI US GUIDED BREAST LOCALIZATION AND BIOPSY RIGHT Right 2022    BI US GUIDED BREAST LOCALIZATION AND BIOPSY RIGHT LAK CLINICAL LEGACY    BREAST LUMPECTOMY Right     COLONOSCOPY      HYSTERECTOMY  2003    LYMPH NODE BIOPSY      TONSILLECTOMY      childhood     Family History   Problem Relation Name Age of Onset    Cancer Father Corey Messer     Asthma Sister Evelyn Rice      Social History     Tobacco Use    Smoking status: Never     Passive exposure: Past    Smokeless tobacco: Never   Substance Use Topics    Alcohol use: Yes     Comment: VERY RARELY     Social History     Substance and Sexual Activity   Drug Use Never       No Known " "Allergies    Current Outpatient Medications   Medication Sig Dispense Refill    sertraline (Zoloft) 100 mg tablet TAKE 1 TABLET(100 MG) BY MOUTH DAILY (Patient taking differently: Take 1 tablet (100 mg) by mouth once daily at bedtime.) 90 tablet 1    anastrozole (Arimidex) 1 mg tablet Take 1 tablet (1 mg total) by mouth once daily.  Swallow whole with a drink of water. (Patient taking differently: Take 1 tablet (1 mg total) by mouth once daily at bedtime.  Swallow whole with a drink of water.) 30 tablet 3    ascorbic acid (Vitamin C) 500 mg tablet Take 1 tablet (500 mg) by mouth once daily.      calcium citrate/vitamin D3 (CITRACAL PLUS D ORAL) Take 2 tablets by mouth once daily.      cholecalciferol (Vitamin D-3) 50 MCG (2000 UT) tablet Take 2 tablets (4,000 Units) by mouth once daily.      glucosam-msm-chond-hrb149-hyal 500-500-66.7 mg tablet Take 1 tablet by mouth once daily.      multivit-minerals/FA/lycopene (ONE DAILY ORAL) 1 tablet Orally Once a day for 30 day(s)      omega 3-dha-epa-fish oil (Fish OiL) 1,200 (144-216) mg capsule Take 1 capsule (1,200 mg) by mouth once daily.      red yeast rice 600 mg capsule Take by mouth.      ubidecarenone (coenzyme Q-10) 60 mg capsule as directed Orally       No current facility-administered medications for this visit.     Review of Systems   Constitutional: Negative.    HENT: Negative.     Eyes: Negative.    Respiratory: Negative.     Cardiovascular: Negative.    Gastrointestinal: Negative.    Endocrine: Negative.    Genitourinary: Negative.    Musculoskeletal:  Positive for arthralgias and neck pain.   Neurological: Negative.    Psychiatric/Behavioral: Negative.       /60   Pulse 80   Temp 35.8 °C (96.4 °F) (Temporal)   Resp 16   Ht 1.626 m (5' 4\")   Wt 79.4 kg (175 lb)   SpO2 98%   BMI 30.04 kg/m²     Physical Exam  Vitals reviewed.   Constitutional:       Appearance: Normal appearance.   HENT:      Head: Normocephalic and atraumatic.      Mouth/Throat: "      Mouth: Mucous membranes are moist.      Pharynx: Oropharynx is clear.   Eyes:      Extraocular Movements: Extraocular movements intact.      Pupils: Pupils are equal, round, and reactive to light.      Comments: Glasses     Cardiovascular:      Rate and Rhythm: Normal rate and regular rhythm.      Heart sounds: Normal heart sounds.   Pulmonary:      Breath sounds: Normal breath sounds.   Abdominal:      General: Bowel sounds are normal.      Palpations: Abdomen is soft.   Musculoskeletal:      Comments: Tenderness with palpation base of left thumb.  Equal  strength noted bilaterally.   Skin:     General: Skin is warm and dry.   Neurological:      General: No focal deficit present.      Mental Status: She is alert and oriented to person, place, and time.   Psychiatric:         Mood and Affect: Mood normal.         Behavior: Behavior normal.        PAT AIRWAY:   Airway:     Mallampati::  I    TM distance::  >3 FB    Neck ROM::  Limited   Teeth intact    ASA:  II  DASI RISK SCORE:  58.2  METS SCORE:  9.9  CHAD2 SCORE:  1.9%  REVISED CARDIAC RISK INDEX:  0.4%  STOP BANG SCORE:  3    CBC - NORMAL  3/27/2024  CMP - NORMAL 6/25/2024    Assessment and Plan:     Left carpal tunnel syndrome, trigger finger of left thumb:  Decompression left medial nerve with carpal tunnel release, release left trigger thumb  H/O breast cancer - s/p right lumpectomy followed by radiation 2023 - currently taking Arimidex    Kathryn Reyes PA-C

## 2024-07-31 ENCOUNTER — ANESTHESIA (OUTPATIENT)
Dept: OPERATING ROOM | Facility: HOSPITAL | Age: 64
End: 2024-07-31
Payer: COMMERCIAL

## 2024-07-31 ENCOUNTER — HOSPITAL ENCOUNTER (OUTPATIENT)
Facility: HOSPITAL | Age: 64
Setting detail: OUTPATIENT SURGERY
Discharge: HOME | End: 2024-07-31
Attending: ORTHOPAEDIC SURGERY | Admitting: ORTHOPAEDIC SURGERY
Payer: COMMERCIAL

## 2024-07-31 ENCOUNTER — ANESTHESIA EVENT (OUTPATIENT)
Dept: OPERATING ROOM | Facility: HOSPITAL | Age: 64
End: 2024-07-31
Payer: COMMERCIAL

## 2024-07-31 VITALS
OXYGEN SATURATION: 99 % | WEIGHT: 176.7 LBS | HEIGHT: 64 IN | TEMPERATURE: 97.7 F | BODY MASS INDEX: 30.17 KG/M2 | SYSTOLIC BLOOD PRESSURE: 112 MMHG | HEART RATE: 64 BPM | RESPIRATION RATE: 13 BRPM | DIASTOLIC BLOOD PRESSURE: 61 MMHG

## 2024-07-31 DIAGNOSIS — M65.312 TRIGGER FINGER OF LEFT THUMB: ICD-10-CM

## 2024-07-31 DIAGNOSIS — G56.02 LEFT CARPAL TUNNEL SYNDROME: Primary | ICD-10-CM

## 2024-07-31 PROCEDURE — 7100000001 HC RECOVERY ROOM TIME - INITIAL BASE CHARGE: Performed by: ORTHOPAEDIC SURGERY

## 2024-07-31 PROCEDURE — 2500000004 HC RX 250 GENERAL PHARMACY W/ HCPCS (ALT 636 FOR OP/ED): Performed by: ORTHOPAEDIC SURGERY

## 2024-07-31 PROCEDURE — 3600000003 HC OR TIME - INITIAL BASE CHARGE - PROCEDURE LEVEL THREE: Performed by: ORTHOPAEDIC SURGERY

## 2024-07-31 PROCEDURE — 2500000004 HC RX 250 GENERAL PHARMACY W/ HCPCS (ALT 636 FOR OP/ED): Mod: JZ | Performed by: PHYSICIAN ASSISTANT

## 2024-07-31 PROCEDURE — 2500000004 HC RX 250 GENERAL PHARMACY W/ HCPCS (ALT 636 FOR OP/ED): Performed by: NURSE ANESTHETIST, CERTIFIED REGISTERED

## 2024-07-31 PROCEDURE — 2500000004 HC RX 250 GENERAL PHARMACY W/ HCPCS (ALT 636 FOR OP/ED): Performed by: PHYSICIAN ASSISTANT

## 2024-07-31 PROCEDURE — 26055 INCISE FINGER TENDON SHEATH: CPT | Performed by: ORTHOPAEDIC SURGERY

## 2024-07-31 PROCEDURE — A64721 PR REVISE MEDIAN N/CARPAL TUNNEL SURG: Performed by: ANESTHESIOLOGY

## 2024-07-31 PROCEDURE — 3700000002 HC GENERAL ANESTHESIA TIME - EACH INCREMENTAL 1 MINUTE: Performed by: ORTHOPAEDIC SURGERY

## 2024-07-31 PROCEDURE — 7100000009 HC PHASE TWO TIME - INITIAL BASE CHARGE: Performed by: ORTHOPAEDIC SURGERY

## 2024-07-31 PROCEDURE — 7100000002 HC RECOVERY ROOM TIME - EACH INCREMENTAL 1 MINUTE: Performed by: ORTHOPAEDIC SURGERY

## 2024-07-31 PROCEDURE — 7100000010 HC PHASE TWO TIME - EACH INCREMENTAL 1 MINUTE: Performed by: ORTHOPAEDIC SURGERY

## 2024-07-31 PROCEDURE — 3600000008 HC OR TIME - EACH INCREMENTAL 1 MINUTE - PROCEDURE LEVEL THREE: Performed by: ORTHOPAEDIC SURGERY

## 2024-07-31 PROCEDURE — 64721 CARPAL TUNNEL SURGERY: CPT | Performed by: ORTHOPAEDIC SURGERY

## 2024-07-31 PROCEDURE — 3700000001 HC GENERAL ANESTHESIA TIME - INITIAL BASE CHARGE: Performed by: ORTHOPAEDIC SURGERY

## 2024-07-31 PROCEDURE — 2500000005 HC RX 250 GENERAL PHARMACY W/O HCPCS: Performed by: ORTHOPAEDIC SURGERY

## 2024-07-31 PROCEDURE — 2500000001 HC RX 250 WO HCPCS SELF ADMINISTERED DRUGS (ALT 637 FOR MEDICARE OP): Performed by: ORTHOPAEDIC SURGERY

## 2024-07-31 PROCEDURE — A64721 PR REVISE MEDIAN N/CARPAL TUNNEL SURG: Performed by: NURSE ANESTHETIST, CERTIFIED REGISTERED

## 2024-07-31 RX ORDER — LIDOCAINE HYDROCHLORIDE 10 MG/ML
0.1 INJECTION, SOLUTION EPIDURAL; INFILTRATION; INTRACAUDAL; PERINEURAL ONCE
Status: DISCONTINUED | OUTPATIENT
Start: 2024-07-31 | End: 2024-07-31 | Stop reason: HOSPADM

## 2024-07-31 RX ORDER — SODIUM CHLORIDE, SODIUM LACTATE, POTASSIUM CHLORIDE, CALCIUM CHLORIDE 600; 310; 30; 20 MG/100ML; MG/100ML; MG/100ML; MG/100ML
100 INJECTION, SOLUTION INTRAVENOUS CONTINUOUS
Status: DISCONTINUED | OUTPATIENT
Start: 2024-07-31 | End: 2024-07-31 | Stop reason: HOSPADM

## 2024-07-31 RX ORDER — OXYCODONE HYDROCHLORIDE 5 MG/1
5 TABLET ORAL EVERY 4 HOURS PRN
Status: DISCONTINUED | OUTPATIENT
Start: 2024-07-31 | End: 2024-07-31 | Stop reason: HOSPADM

## 2024-07-31 RX ORDER — DOXYCYCLINE 100 MG/1
100 CAPSULE ORAL 2 TIMES DAILY
Qty: 14 CAPSULE | Refills: 0 | Status: SHIPPED | OUTPATIENT
Start: 2024-07-31 | End: 2024-08-07

## 2024-07-31 RX ORDER — ONDANSETRON HYDROCHLORIDE 2 MG/ML
INJECTION, SOLUTION INTRAVENOUS AS NEEDED
Status: DISCONTINUED | OUTPATIENT
Start: 2024-07-31 | End: 2024-07-31

## 2024-07-31 RX ORDER — ONDANSETRON HYDROCHLORIDE 2 MG/ML
4 INJECTION, SOLUTION INTRAVENOUS ONCE AS NEEDED
Status: DISCONTINUED | OUTPATIENT
Start: 2024-07-31 | End: 2024-07-31 | Stop reason: HOSPADM

## 2024-07-31 RX ORDER — LIDOCAINE HYDROCHLORIDE 10 MG/ML
INJECTION INFILTRATION; PERINEURAL AS NEEDED
Status: DISCONTINUED | OUTPATIENT
Start: 2024-07-31 | End: 2024-07-31 | Stop reason: HOSPADM

## 2024-07-31 RX ORDER — FENTANYL CITRATE 50 UG/ML
INJECTION, SOLUTION INTRAMUSCULAR; INTRAVENOUS AS NEEDED
Status: DISCONTINUED | OUTPATIENT
Start: 2024-07-31 | End: 2024-07-31

## 2024-07-31 RX ORDER — MIDAZOLAM HYDROCHLORIDE 1 MG/ML
INJECTION, SOLUTION INTRAMUSCULAR; INTRAVENOUS AS NEEDED
Status: DISCONTINUED | OUTPATIENT
Start: 2024-07-31 | End: 2024-07-31

## 2024-07-31 RX ORDER — BACITRACIN 500 [USP'U]/G
OINTMENT TOPICAL AS NEEDED
Status: DISCONTINUED | OUTPATIENT
Start: 2024-07-31 | End: 2024-07-31 | Stop reason: HOSPADM

## 2024-07-31 RX ORDER — FENTANYL CITRATE 50 UG/ML
25 INJECTION, SOLUTION INTRAMUSCULAR; INTRAVENOUS EVERY 5 MIN PRN
Status: DISCONTINUED | OUTPATIENT
Start: 2024-07-31 | End: 2024-07-31 | Stop reason: HOSPADM

## 2024-07-31 RX ORDER — MEPERIDINE HYDROCHLORIDE 25 MG/ML
12.5 INJECTION INTRAMUSCULAR; INTRAVENOUS; SUBCUTANEOUS EVERY 10 MIN PRN
Status: DISCONTINUED | OUTPATIENT
Start: 2024-07-31 | End: 2024-07-31 | Stop reason: HOSPADM

## 2024-07-31 RX ORDER — FENTANYL CITRATE 50 UG/ML
50 INJECTION, SOLUTION INTRAMUSCULAR; INTRAVENOUS EVERY 5 MIN PRN
Status: DISCONTINUED | OUTPATIENT
Start: 2024-07-31 | End: 2024-07-31 | Stop reason: HOSPADM

## 2024-07-31 RX ORDER — PROPOFOL 10 MG/ML
INJECTION, EMULSION INTRAVENOUS CONTINUOUS PRN
Status: DISCONTINUED | OUTPATIENT
Start: 2024-07-31 | End: 2024-07-31

## 2024-07-31 RX ORDER — BUPIVACAINE HYDROCHLORIDE 5 MG/ML
INJECTION, SOLUTION PERINEURAL AS NEEDED
Status: DISCONTINUED | OUTPATIENT
Start: 2024-07-31 | End: 2024-07-31 | Stop reason: HOSPADM

## 2024-07-31 RX ORDER — LABETALOL HYDROCHLORIDE 5 MG/ML
5 INJECTION, SOLUTION INTRAVENOUS ONCE AS NEEDED
Status: DISCONTINUED | OUTPATIENT
Start: 2024-07-31 | End: 2024-07-31 | Stop reason: HOSPADM

## 2024-07-31 RX ORDER — ACETAMINOPHEN 325 MG/1
650 TABLET ORAL EVERY 6 HOURS PRN
COMMUNITY

## 2024-07-31 RX ORDER — CEFAZOLIN SODIUM 2 G/100ML
2 INJECTION, SOLUTION INTRAVENOUS ONCE
Status: COMPLETED | OUTPATIENT
Start: 2024-07-31 | End: 2024-07-31

## 2024-07-31 ASSESSMENT — PAIN SCALES - GENERAL
PAINLEVEL_OUTOF10: 1
PAINLEVEL_OUTOF10: 1
PAINLEVEL_OUTOF10: 0 - NO PAIN
PAINLEVEL_OUTOF10: 0 - NO PAIN
PAINLEVEL_OUTOF10: 1
PAINLEVEL_OUTOF10: 2
PAINLEVEL_OUTOF10: 1
PAINLEVEL_OUTOF10: 0 - NO PAIN
PAINLEVEL_OUTOF10: 1

## 2024-07-31 ASSESSMENT — PAIN DESCRIPTION - DESCRIPTORS
DESCRIPTORS: NUMBNESS

## 2024-07-31 ASSESSMENT — COLUMBIA-SUICIDE SEVERITY RATING SCALE - C-SSRS
2. HAVE YOU ACTUALLY HAD ANY THOUGHTS OF KILLING YOURSELF?: NO
1. IN THE PAST MONTH, HAVE YOU WISHED YOU WERE DEAD OR WISHED YOU COULD GO TO SLEEP AND NOT WAKE UP?: NO
6. HAVE YOU EVER DONE ANYTHING, STARTED TO DO ANYTHING, OR PREPARED TO DO ANYTHING TO END YOUR LIFE?: NO

## 2024-07-31 ASSESSMENT — PAIN - FUNCTIONAL ASSESSMENT
PAIN_FUNCTIONAL_ASSESSMENT: 0-10

## 2024-07-31 NOTE — ANESTHESIA PREPROCEDURE EVALUATION
Patient: Rebeca Ko    Procedure Information       Date/Time: 07/31/24 0700    Procedures:       Decompression Median Nerve with Carpal Tunnel Release (Left: Arm Lower)      RELEASE,SOFT TISSUE,UPPER EXTREMITY ( L thumb trigger release ) (Left: Arm Lower) - L thumb trigger release    Location: [facility] / [facility]    Surgeons: Sergio Cannon MD            Relevant Problems   Cardiac   (+) Hyperlipidemia      Neuro   (+) Anxiety   (+) Depressed   (+) Left carpal tunnel syndrome      Musculoskeletal   (+) Left carpal tunnel syndrome      GYN   (+) History of hysterectomy   (+) Malignant neoplasm of upper-outer quadrant of female breast (Multi)       Clinical information reviewed:   Tobacco  Allergies  Meds   Med Hx  Surg Hx  OB Status  Fam Hx  Soc   Hx        NPO Detail:  NPO/Void Status  Carbohydrate Drink Given Prior to Surgery? : N  Date of Last Liquid: 07/31/24  Time of Last Liquid: 0015  Date of Last Solid: 07/30/24  Time of Last Solid: 1930  Last Intake Type: Clear fluids; Food  Time of Last Void: 0535         Physical Exam    Airway  Mallampati: II  TM distance: >3 FB  Neck ROM: full     Cardiovascular    Dental - normal exam     Pulmonary    Abdominal            Anesthesia Plan    History of general anesthesia?: yes  History of complications of general anesthesia?: no    ASA 2     MAC     intravenous induction   Anesthetic plan and risks discussed with patient.    Plan discussed with CRNA.

## 2024-07-31 NOTE — OP NOTE
Decompression Median Nerve with Carpal Tunnel Release (L), RELEASE,SOFT TISSUE,UPPER EXTREMITY ( L thumb trigger release ) (L) Operative Note     Date: 2024  OR Location: [facility]    Name: Rebeca Ko, : 1960, Age: 64 y.o., MRN: [id number], Sex: female    Diagnosis  Pre-op Diagnosis      * Left carpal tunnel syndrome [G56.02]     * Trigger finger of left thumb [M65.312] Post-op Diagnosis     * Left carpal tunnel syndrome [G56.02]     * Trigger finger of left thumb [M65.312]     Procedures  Decompression Median Nerve with Carpal Tunnel Release  99045 - [place] NEUROPLASTY &/TRANSPOS MEDIAN NRV CARPAL TUNNE    RELEASE,SOFT TISSUE,UPPER EXTREMITY ( L thumb trigger release )  62379 - [place] TENDON SHEATH INCISION    1.  Left open carpal tunnel release #1 left open carpal tunnel release  #2 left thumb trigger open A1 pulley release    Surgeons      * Sergio Cannon - Primary    Resident/Fellow/Other Assistant:  Surgeons and Role:  * No surgeons found with a matching role *    Procedure Summary  Anesthesia: Monitor Anesthesia Care  ASA: II  Anesthesia Staff: Anesthesiologist: Juventino Keating MD  CRNA: NIEVES Armenta-CRNA  Estimated Blood Loss: 1mL  Intra-op Medications:   Administrations occurring from 0700 to 0745 on 24:   Medication Name Total Dose   lidocaine (Xylocaine) 10 mg/mL (1 %) injection 5 mL   BUPivacaine HCl (Marcaine) 0.5 % (5 mg/mL) injection 5 mL   bacitracin ointment 1 Application   ceFAZolin (Ancef) 2 g in dextrose (iso)  mL 2 g              Anesthesia Record               Intraprocedure I/O Totals       None           Specimen: No specimens collected     Staff:   Circulator: Michelle Khanub Person: Dian  Scrub Person: Nati         Drains and/or Catheters: * None in log *    Tourniquet Times:   * Missing tourniquet times found for documented tourniquets in lo *     Implants:     Findings: Moderate nerve swelling    Indications: Rebeca Ko is an 64 y.o. female who  is having surgery for Left carpal tunnel syndrome [G56.02]  Trigger finger of left thumb [M65.312]. Citlaly patient comes in today understanding clearly that there are severe risk such as nerve artery tendon damage infection continued pain the amount of recovery can be variable especially the carpal tunnel understands recovery phase answered all preoperative questions informed consent obtained    The patient was seen in the preoperative area. The risks, benefits, complications, treatment options, non-operative alternatives, expected recovery and outcomes were discussed with the patient. The possibilities of reaction to medication, pulmonary aspiration, injury to surrounding structures, bleeding, recurrent infection, the need for additional procedures, failure to diagnose a condition, and creating a complication requiring transfusion or operation were discussed with the patient. The patient concurred with the proposed plan, giving informed consent.  The site of surgery was properly noted/marked if necessary per policy. The patient has been actively warmed in preoperative area. Preoperative antibiotics have been ordered and given within 1 hours of incision. Venous thrombosis prophylaxis are not indicated.    Procedure Details: Pleasant patient brought the operating room and after sterilely prepping draping and performing a timeout we placed abundant local at the thumb and over the carpal tunnel region and at this juncture made a 2 cm oblique incision over the A1 pulley of the left thumb going down protecting the neurovascular bundles release and A1 pulley fully and pretendinous fashion no triggering no underlying tendon damage full excursion at this point copious irrigated and closed the wound    We made a 3 cm incision over the transverse carpal ligament in line with the radial aspect of the left ring finger going down through the skin bluntly spread on the ligament, we opened up the ligament sharply and released  the distal aspects of the superficial fat and proximally with greater than 4 cm of forearm fascia the nerve was swollen with a light adhesion that was released but no other abnormalities we copious irrigated closed the wound fingers pinked up nicely light compressive dressing was placed patient understands postoperative care and complications to look for and call immediately if any occur.  No complications  Complications:  None; patient tolerated the procedure well.    Disposition: PACU - hemodynamically stable.  Condition: stable         Additional Details: 0    Attending Attestation: I performed the procedure.    Sergio Cannon  Phone Number: [phone]

## 2024-07-31 NOTE — ANESTHESIA POSTPROCEDURE EVALUATION
Patient: Rebeca Ko    Procedure Summary       Date: 07/31/24 Room / Location: [facility] / [facility]    Anesthesia Start: 0650 Anesthesia Stop: 0735    Procedures:       Decompression Median Nerve with Carpal Tunnel Release (Left: Arm Lower)      RELEASE,SOFT TISSUE,UPPER EXTREMITY ( L thumb trigger release ) (Left: Arm Lower) Diagnosis:       Left carpal tunnel syndrome      Trigger finger of left thumb      (Left carpal tunnel syndrome [G56.02])      (Trigger finger of left thumb [M65.312])    Surgeons: Sergio Cannon MD Responsible Provider: Juventino Keating MD    Anesthesia Type: MAC ASA Status: 2            Anesthesia Type: MAC    Vitals Value Taken Time   /69 07/31/24 0740   Temp 36.0 07/31/24 0742   Pulse 81 07/31/24 0741   Resp 13 07/31/24 0741   SpO2 96 % 07/31/24 0741   Vitals shown include unfiled device data.    Anesthesia Post Evaluation    Patient location during evaluation: PACU  Patient participation: complete - patient participated  Level of consciousness: awake  Pain management: adequate  Airway patency: patent  Cardiovascular status: acceptable  Respiratory status: acceptable  Hydration status: acceptable  Postoperative Nausea and Vomiting: none        No notable events documented.

## 2024-07-31 NOTE — DISCHARGE INSTRUCTIONS
You had hand surgery today, due to local anesthesia the hand may be numb for a few hours after surgery, this is very normal and should slowly wear off.    Some swelling and bruising in the forearm and palm is very normal after surgery.    Keep dressing on hand for 2-3 days, after that you may take dressing off and put a band-aid over wound.    DO NOT get wound wet for 5 days post-op, after that you can shower/wash hand and get wound wet, pat dry.    If able, take 800mg Ibuprofen and/or Tylenol for pain after surgery if needed.    Use the hand for activities as tolerated, goal is to regain full digital and wrist motion as quickly as possible but avoid heavy lifting until advised.    While some symptoms may improve quickly after surgery, some symptoms take time to slowly improve and the results can be very variable.    Follow up in the office by calling [phone] in 10-13 days for a post-op appt

## 2024-07-31 NOTE — NURSING NOTE
Pt no iv bp on right arm    Surgery on left hand    Got iv in most upper part of surgical arm I could    Bp possibly on leg during procedure

## 2024-08-01 ASSESSMENT — PAIN SCALES - GENERAL: PAINLEVEL_OUTOF10: 4

## 2024-08-12 ENCOUNTER — APPOINTMENT (OUTPATIENT)
Dept: ORTHOPEDIC SURGERY | Facility: CLINIC | Age: 64
End: 2024-08-12
Payer: COMMERCIAL

## 2024-08-12 DIAGNOSIS — G56.02 LEFT CARPAL TUNNEL SYNDROME: Primary | ICD-10-CM

## 2024-08-12 DIAGNOSIS — M65.312 TRIGGER FINGER OF LEFT THUMB: ICD-10-CM

## 2024-08-12 PROCEDURE — 1036F TOBACCO NON-USER: CPT | Performed by: ORTHOPAEDIC SURGERY

## 2024-08-12 PROCEDURE — L3908 WHO COCK-UP NONMOLDE PRE OTS: HCPCS | Performed by: ORTHOPAEDIC SURGERY

## 2024-08-12 PROCEDURE — 99024 POSTOP FOLLOW-UP VISIT: CPT | Performed by: ORTHOPAEDIC SURGERY

## 2024-08-12 ASSESSMENT — PAIN - FUNCTIONAL ASSESSMENT: PAIN_FUNCTIONAL_ASSESSMENT: 0-10

## 2024-08-12 ASSESSMENT — PAIN SCALES - GENERAL: PAINLEVEL_OUTOF10: 2

## 2024-08-12 NOTE — PROGRESS NOTES
Reason for Appointment  Chief Complaint   Patient presents with    Left Wrist - Post-op     History of Present Illness  Patient is here today 2 weeks s/p left carpal tunnel and a left thumb trigger release on 7/31/24. Patient is doing well overall.  Numbness and tingling has improved in the hand.  She does have some postoperative pain with thumb motion.  Wounds are healing nicely with no signs of infection, sutures removed today.  She was educated on starting scar massage in 4 days and we did get her a gel brace to help soften her scar.  We would be happy to see her back in any point for any further issues.    Assessment   Left carpal tunnel  Left thumb trigger finger

## 2024-08-27 DIAGNOSIS — Z85.3 PERSONAL HISTORY OF BREAST CANCER: ICD-10-CM

## 2024-08-27 DIAGNOSIS — Z12.31 SCREENING MAMMOGRAM FOR BREAST CANCER: Primary | ICD-10-CM

## 2024-09-11 ENCOUNTER — APPOINTMENT (OUTPATIENT)
Dept: RADIOLOGY | Facility: CLINIC | Age: 64
End: 2024-09-11
Payer: COMMERCIAL

## 2024-09-17 ENCOUNTER — HOSPITAL ENCOUNTER (OUTPATIENT)
Dept: RADIOLOGY | Facility: CLINIC | Age: 64
Discharge: HOME | End: 2024-09-17
Payer: COMMERCIAL

## 2024-09-17 ENCOUNTER — APPOINTMENT (OUTPATIENT)
Dept: SURGERY | Facility: CLINIC | Age: 64
End: 2024-09-17
Payer: COMMERCIAL

## 2024-09-17 VITALS — HEIGHT: 65 IN | BODY MASS INDEX: 28.32 KG/M2 | WEIGHT: 170 LBS

## 2024-09-17 DIAGNOSIS — Z85.3 PERSONAL HISTORY OF BREAST CANCER: ICD-10-CM

## 2024-09-17 DIAGNOSIS — Z12.31 SCREENING MAMMOGRAM FOR BREAST CANCER: ICD-10-CM

## 2024-09-17 PROCEDURE — 77067 SCR MAMMO BI INCL CAD: CPT | Performed by: STUDENT IN AN ORGANIZED HEALTH CARE EDUCATION/TRAINING PROGRAM

## 2024-09-17 PROCEDURE — 77067 SCR MAMMO BI INCL CAD: CPT

## 2024-09-17 PROCEDURE — 77063 BREAST TOMOSYNTHESIS BI: CPT | Performed by: STUDENT IN AN ORGANIZED HEALTH CARE EDUCATION/TRAINING PROGRAM

## 2024-09-26 NOTE — PROGRESS NOTES
Subjective   Patient ID: Rebeca Ko is a 64 y.o. female who presents for breast exam and follow up imaging.  HPI  Patient returns to clinic for breast exam and follow up imaging.  Patient last seen in office on 7/11/2023 for normal breast exam.  Patient last saw Aries Alicia ([facility]) 3/19/2024 for follow up appointment and instructed to return January 2025.  Patient continuing the Anastrozole.    BI MAMMO BILATERAL SCREENING TOMOSYNTHESIS;  9/17/2024 8:34 am      ACCESSION NUMBER(S):  [id number]      ORDERING CLINICIAN:  ARIES ALICIA      INDICATION:  Screening. History of right breast cancer treated with lumpectomy and  radiation.      ,Z12.31 Encounter for screening mammogram for malignant neoplasm of  breast,Z85.3 Personal history of malignant neoplasm of breast      COMPARISON:  09/15/2023 and all relevant prior breast imaging exams available at  the time of dictation.      FINDINGS:  2D and tomosynthesis images were reviewed at 1 mm slice thickness.  Per technologist, best possible images were obtained.      Density:  There are scattered areas of fibroglandular density.      Postsurgical changes of lumpectomy are noted in the upper-outer right  breast at posterior depth and right axilla. Interval decrease in mild  diffuse skin and trabecular thickening of the right breast secondary  to postradiation changes. No suspicious masses or calcifications are  identified.      IMPRESSION:  No mammographic evidence of malignancy.      BI-RADS CATEGORY:  BI-RADS Category:  2 Benign.  Recommendation:  Annual Screening.  Recommended Date:  1 Year.  Laterality:  Bilateral.              For any future breast imaging appointments, please call [phone]).          MACRO:  None    Social History:  Tobacco Use - NEVER  Do you use any recreational drugs - NEVER  Alcohol Use - MONTHLY OR LESS  Caffeine Intake - 1 CUP DAILY  Working - PART TIME  Marital status -   Living with -SPOUSE     Past Medical  "History:   Diagnosis Date    Acne     Anxiety     Arthritis     Breast cancer (Multi) 11-6-22    COVID-19 Dec 2021    No hospitalization    Depression Feb 2017? Or earlier    Fibroid     Hyperlipidemia     Joint pain     Nephrolithiasis     Personal history of irradiation 3-30-23    Skin tag     Urinary tract infection     Varicella     Verruca      Past Surgical History:   Procedure Laterality Date    BI MAMMO GUIDED BREAST RIGHT LOCALIZATION Right 01/05/2023    BI MAMMO GUIDED LOCALIZATION BREAST RIGHT LAK SURG [facility] LEGACY    BI US GUIDED BREAST LOCALIZATION AND BIOPSY RIGHT Right 11/16/2022    BI US GUIDED BREAST LOCALIZATION AND BIOPSY RIGHT LAK CLINICAL LEGACY    BREAST BIOPSY      BREAST LUMPECTOMY Right 2023    COLONOSCOPY  2023    HYSTERECTOMY  2003    LYMPH NODE BIOPSY      TONSILLECTOMY      childhood     Family History   Problem Relation Name Age of Onset    Cancer Father Corey Messer     Asthma Sister Evelyn Rice      No Known Allergies    Review of Systems  /68   Pulse 72   Temp 36.6 °C (97.8 °F)   Resp 17   Ht 1.651 m (5' 5\")   Wt 77.1 kg (170 lb)   SpO2 98%   BMI 28.29 kg/m²     Objective   Physical Exam  Physical Exam:  /68   Pulse 72   Temp 36.6 °C (97.8 °F)   Resp 17   Ht 1.651 m (5' 5\")   Wt 77.1 kg (170 lb)   SpO2 98%   BMI 28.29 kg/m²    affect.   Physical Exam:    GENERAL APPEARANCE: Patient appears in no acute distress.   EYES: Sclera non-icteric, PERRLA.   ENT Normal appearance of ears and nose.   NECK/THYROID: Neck: no masses. Thyroid: no masses.   LYMPH NODES: No cervical or supraclavicular lymphadenopathy.   CARDIOVASCULAR Heart: RRR, no murmurs; Carotid bruits: none; Peripheral edema: none.   RESPIRATORY: Lungs: Bilateral inspiratory and expiratory wheezing; no respiratory distress.   BREASTS: Exam done both in upright and supine position. On inspection no skin dimpling,  mild erythema of the right breast inferior aspect and minimal skin thickening.  Masses: none " palpable in either breast.  Axillary lymphadenopathy: none.   GI (ABDOMEN) No intraabdominal mass appreciated, no hepatosplenomegaly; Hernia: none; Tenderness: none.   PSYCH: Patient oriented to time, place and person, normal affect.    Assessment/Plan   Problem List Items Addressed This Visit             ICD-10-CM    Malignant neoplasm of upper-outer quadrant of female breast (Multi) - Primary C50.419     Patient with normal mammogram.  Normal breast exam.  Patient still within 2 years of breast cancer diagnosis.  Recommend unilateral mammogram in March 2025 with a repeat exam by me at that time.          Breast History:   Status post a right breast diagnostic mammogram that in the right breast revealed a spiculated mass 11-12 o'clock posterior depth with the central portion of the lesion measuring 6 mm in diameter.  Right breast ultrasound revealed a hypoechoic acoustic shadowing spiculated irregular mass measuring approximately 5 mm in diameter at 11-12 o'clock, 7.4 cm DFN. Axillary tail lymph nodes were normal in appearance with no lymphadenopathy. This was highly suspicious for neoplasm. Birads category 5.   She underwent am ultrasound-guided needle biopsy right breast at 12:00 o'clock on 11/04/2022. Pathology revealing right breast 12:00 o'clock biopsy, invasive ductal carcinoma; tumor measuring 7 mm in greatest dimensions; grade 1 of 3; immunostatins for p63 and actin demonstrate absence of myoepithelial layer; ER -80-90 % strong - moderate, positive; ME - 80-90 % strong-moderate, positive; HER2/samantha - negative.  Status post right breast wire localization and lumpectomy with a sentinel node biopsy on 1/5/23. One sentinel node was excised showing mircromets. Right breast tissue revealed invasive ductal carcinoma, with the tumor at 12 o'clock measuring 8 mm, grade 1 of 3, with clean margins, 7 mm from the closest anterior margin. Noted was an ill-defined firm area measuring approximately 0.7 x 0.7 x 0.5 cm,  located approximately 0.5 cm from the lateral margin, 1 cm from the posterior margin, and 1.5 cm from the medial margin. ER/TN both strong positive at 80-90%, [id number] negative.  Patient with normal breast exam.  Normal imaging.  Previous Biopsy: No. Menarche age: 15. Age of first delivery: 36. Breastfeed: Yes. Menopause age: 57. HRT: No. Family history of breast cancer: No. Family history of ovarian cancer: No. Family history of pancreatic cancer: No.        Alka Alejo MD 09/26/24 2:20 PM

## 2024-09-26 NOTE — ASSESSMENT & PLAN NOTE
Patient with normal mammogram.  Normal breast exam.  Patient still within 2 years of breast cancer diagnosis.  Recommend unilateral mammogram in March 2025 with a repeat exam by me at that time.

## 2024-09-27 ENCOUNTER — OFFICE VISIT (OUTPATIENT)
Dept: SURGERY | Facility: CLINIC | Age: 64
End: 2024-09-27
Payer: COMMERCIAL

## 2024-09-27 VITALS
TEMPERATURE: 97.8 F | WEIGHT: 170 LBS | BODY MASS INDEX: 28.32 KG/M2 | DIASTOLIC BLOOD PRESSURE: 68 MMHG | HEART RATE: 72 BPM | RESPIRATION RATE: 17 BRPM | OXYGEN SATURATION: 98 % | HEIGHT: 65 IN | SYSTOLIC BLOOD PRESSURE: 127 MMHG

## 2024-09-27 DIAGNOSIS — C50.411 MALIGNANT NEOPLASM OF UPPER-OUTER QUADRANT OF RIGHT BREAST IN FEMALE, ESTROGEN RECEPTOR POSITIVE: Primary | ICD-10-CM

## 2024-09-27 DIAGNOSIS — Z17.0 MALIGNANT NEOPLASM OF UPPER-OUTER QUADRANT OF RIGHT BREAST IN FEMALE, ESTROGEN RECEPTOR POSITIVE: Primary | ICD-10-CM

## 2024-09-27 PROCEDURE — 3008F BODY MASS INDEX DOCD: CPT | Performed by: SURGERY

## 2024-09-27 PROCEDURE — 99214 OFFICE O/P EST MOD 30 MIN: CPT | Performed by: SURGERY

## 2024-09-27 PROCEDURE — 1036F TOBACCO NON-USER: CPT | Performed by: SURGERY

## 2024-09-27 ASSESSMENT — LIFESTYLE VARIABLES
AUDIT-C TOTAL SCORE: 1
HOW OFTEN DO YOU HAVE A DRINK CONTAINING ALCOHOL: MONTHLY OR LESS
HOW OFTEN DO YOU HAVE SIX OR MORE DRINKS ON ONE OCCASION: NEVER
HOW MANY STANDARD DRINKS CONTAINING ALCOHOL DO YOU HAVE ON A TYPICAL DAY: 1 OR 2
SKIP TO QUESTIONS 9-10: 1

## 2024-09-27 ASSESSMENT — ENCOUNTER SYMPTOMS
LOSS OF SENSATION IN FEET: 0
DEPRESSION: 0
OCCASIONAL FEELINGS OF UNSTEADINESS: 0

## 2024-09-27 ASSESSMENT — COLUMBIA-SUICIDE SEVERITY RATING SCALE - C-SSRS
2. HAVE YOU ACTUALLY HAD ANY THOUGHTS OF KILLING YOURSELF?: NO
6. HAVE YOU EVER DONE ANYTHING, STARTED TO DO ANYTHING, OR PREPARED TO DO ANYTHING TO END YOUR LIFE?: NO
1. IN THE PAST MONTH, HAVE YOU WISHED YOU WERE DEAD OR WISHED YOU COULD GO TO SLEEP AND NOT WAKE UP?: NO

## 2024-09-27 ASSESSMENT — PATIENT HEALTH QUESTIONNAIRE - PHQ9
SUM OF ALL RESPONSES TO PHQ9 QUESTIONS 1 & 2: 0
1. LITTLE INTEREST OR PLEASURE IN DOING THINGS: NOT AT ALL
2. FEELING DOWN, DEPRESSED OR HOPELESS: NOT AT ALL

## 2024-09-27 ASSESSMENT — PAIN SCALES - GENERAL: PAINLEVEL: 0-NO PAIN

## 2024-11-11 NOTE — PROGRESS NOTES
Leif Ko is a 64 y.o. female who presents for the following: Skin Check.     Review of Systems:  No other skin or systemic complaints other than what is documented elsewhere in the note.    The following portions of the chart were reviewed this encounter and updated as appropriate:   Tobacco  Allergies  Meds  Problems  Med Hx  Surg Hx         Skin Cancer History  No skin cancer on file.      Specialty Problems    None       Objective   Well appearing patient in no apparent distress; mood and affect are within normal limits.    A full examination was performed including scalp, head, eyes, ears, nose, lips, neck, chest, axillae, abdomen, back, buttocks, bilateral upper extremities, bilateral lower extremities, hands, feet, fingers, toes, fingernails, and toenails. All findings within normal limits unless otherwise noted below.      Assessment/Plan   1. Angioma of skin  Scattered cherry-red papule(s).    A cherry hemangioma is a small macule (small, flat, smooth area) or papule (small, solid bump) formed from an overgrowth of tiny blood vessels in the skin. Cherry hemangiomas are characteristically red or purplish in color. They often first appear in middle adulthood and usually increase in number with age. Cherry hemangiomas are noncancerous (benign) and are common in adults.    The present appearance of the lesion is not worrisome but it should continue to be observed and testing/treatment may be warranted if change occurs.    2. Fibrous papule of skin  Left Upper Cutaneous Lip  2 mm Flesh toned papule without arborizing telangiectasias.     A fibrous papule of the nose is a harmless, small, raised pimple-like growth (papule) on the nose or central face that feels firm to the touch and is skin-colored or pink. There is usually a single papule, but you may have multiple papules.    The present appearance of the lesion is not worrisome but it should continue to be observed and  Quality 226: Preventive Care And Screening: Tobacco Use: Screening And Cessation Intervention: Patient screened for tobacco use and is an ex/non-smoker testing/treatment may be warranted if change occurs.      3. Nevus (3)  Generalized, Left Thigh - Anterior, Right Thigh - Anterior  Scattered, uniform and benign-appearing, regular brown melanocytic papules and macules.    Left anterior thigh has a 3.5 x 3.5 mm slightly irregular shape tan brown macule with asymmetrical dermoscopic pattern with that reticular globular pattern.   Right medial thigh has a 3 x 3 mm irregular shape tan brown macule with similar pattern noted in the 1st nevus noted above: asymmetrical dermoscopic pattern with that reticular globular pattern.       Discussed indeterminate nature of nevus noted. Discussed monitoring vs biospsy. Patient wants to monitor for now. Offered to photo with patient's phone for future reference, photo taken.  They will recheck monthly and if changes shape, size, or color will return to clinic sooner.    The remaining lesions on exam today are not worrisome but it should continue to be observed and testing/treatment may be warranted if change occurs.    4. Seborrheic keratosis  Stuck on verrucous, tan-brown papules and plaques.      Seborrheic keratoses are common noncancerous (benign) growths of unknown cause seen in adults due to a thickening of an area of the top skin layer. Seborrheic keratoses may appear as if they are stuck on to the skin. They have distinct borders, and they may appear as papules (small, solid bumps) or plaques (solid, raised patches that are bigger than a thumbnail). They may be the same color as your skin, or they may be pink, light brown, darker brown, or very dark brown, or sometimes may appear black.    There is no way to prevent new seborrheic keratoses from forming. Seborrheic keratoses can be removed, but removal is considered a cosmetic issue and is usually not covered by insurance.    PLAN  No treatment is needed unless there is irritation from clothing, such as itching or bleeding.  2.   Some lotions containing alpha hydroxy acids,  Detail Level: Detailed salicylic acid, or urea may make the areas feel smoother with regular use but will not eliminate them.    5. Lentigo  Scattered tan macules in sun-exposed areas.    A solar lentigo (plural, solar lentigines), also known as a sun-induced freckle or senile lentigo, is a dark (hyperpigmented) lesion caused by natural or artificial ultraviolet (UV) light. Solar lentigines may be single or multiple. This type of lentigo is different from a simple lentigo (lentigo simplex) because it is caused by exposure to UV light. Solar lentigines are benign, but they do indicate excessive sun exposure, a risk factor for the development of skin cancer.    To prevent solar lentigines, avoid exposure to sunlight in midday (10 AM to 3 PM), wear sun-protective clothing (tightly woven clothes and hats), and apply sunscreen (SPF 30 UVA and UVB block).    The present appearance of the lesion is not worrisome but it should continue to be observed and testing/treatment may be warranted if change occurs.    6. Skin changes due to chronic exposure to nonionizing radiation  Actinic changes in the form of freckles, lentigines and hyper/hypopigmentation     ABCDEs of melanoma and atypical moles were discussed with the patient.    Patient was instructed to perform monthly self skin examination.  We recommended that the patient have regular full skin exams given an increased risk of subsequent skin cancers.    The patient was instructed to use sun protective behaviors including use of broad spectrum sunscreens and sun protective clothing to reduce risk of skin cancers.    Warning signs of non-melanoma skin cancer discussed.        Return to clinic in 1 year for skin check/follow up or sooner if needed

## 2024-12-01 DIAGNOSIS — F32.A DEPRESSION, UNSPECIFIED DEPRESSION TYPE: ICD-10-CM

## 2024-12-02 RX ORDER — SERTRALINE HYDROCHLORIDE 100 MG/1
100 TABLET, FILM COATED ORAL DAILY
Qty: 90 TABLET | Refills: 1 | Status: SHIPPED | OUTPATIENT
Start: 2024-12-02

## 2024-12-17 ENCOUNTER — APPOINTMENT (OUTPATIENT)
Dept: HEMATOLOGY/ONCOLOGY | Facility: CLINIC | Age: 64
End: 2024-12-17
Payer: COMMERCIAL

## 2024-12-23 ENCOUNTER — APPOINTMENT (OUTPATIENT)
Dept: HEMATOLOGY/ONCOLOGY | Facility: CLINIC | Age: 64
End: 2024-12-23
Payer: COMMERCIAL

## 2024-12-27 DIAGNOSIS — C50.411 MALIGNANT NEOPLASM OF UPPER-OUTER QUADRANT OF RIGHT BREAST IN FEMALE, ESTROGEN RECEPTOR POSITIVE: Primary | ICD-10-CM

## 2024-12-27 DIAGNOSIS — Z17.0 MALIGNANT NEOPLASM OF UPPER-OUTER QUADRANT OF RIGHT BREAST IN FEMALE, ESTROGEN RECEPTOR POSITIVE: Primary | ICD-10-CM

## 2024-12-30 ENCOUNTER — LAB (OUTPATIENT)
Dept: LAB | Facility: LAB | Age: 64
End: 2024-12-30
Payer: COMMERCIAL

## 2024-12-30 DIAGNOSIS — Z17.0 MALIGNANT NEOPLASM OF UPPER-OUTER QUADRANT OF RIGHT BREAST IN FEMALE, ESTROGEN RECEPTOR POSITIVE: ICD-10-CM

## 2024-12-30 DIAGNOSIS — C50.411 MALIGNANT NEOPLASM OF UPPER-OUTER QUADRANT OF RIGHT BREAST IN FEMALE, ESTROGEN RECEPTOR POSITIVE: ICD-10-CM

## 2024-12-30 LAB
ALBUMIN SERPL BCP-MCNC: 4.2 G/DL (ref 3.4–5)
ALP SERPL-CCNC: 71 U/L (ref 33–136)
ALT SERPL W P-5'-P-CCNC: 22 U/L (ref 7–45)
ANION GAP SERPL CALC-SCNC: 10 MMOL/L (ref 10–20)
AST SERPL W P-5'-P-CCNC: 21 U/L (ref 9–39)
BILIRUB SERPL-MCNC: 0.4 MG/DL (ref 0–1.2)
BUN SERPL-MCNC: 21 MG/DL (ref 6–23)
CALCIUM SERPL-MCNC: 10.1 MG/DL (ref 8.6–10.3)
CHLORIDE SERPL-SCNC: 105 MMOL/L (ref 98–107)
CO2 SERPL-SCNC: 32 MMOL/L (ref 21–32)
CREAT SERPL-MCNC: 0.99 MG/DL (ref 0.5–1.05)
EGFRCR SERPLBLD CKD-EPI 2021: 65 ML/MIN/1.73M*2
GLUCOSE SERPL-MCNC: 105 MG/DL (ref 74–99)
MAGNESIUM SERPL-MCNC: 2.06 MG/DL (ref 1.6–2.4)
PHOSPHATE SERPL-MCNC: 4.6 MG/DL (ref 2.5–4.9)
POTASSIUM SERPL-SCNC: 4 MMOL/L (ref 3.5–5.3)
PROT SERPL-MCNC: 6.9 G/DL (ref 6.4–8.2)
SODIUM SERPL-SCNC: 143 MMOL/L (ref 136–145)

## 2024-12-31 PROBLEM — Z51.81 ENCOUNTER FOR MONITORING AROMATASE INHIBITOR THERAPY: Status: ACTIVE | Noted: 2024-12-31

## 2024-12-31 PROBLEM — Z79.83 ENCOUNTER FOR MONITORING ZOLEDRONIC ACID THERAPY: Status: ACTIVE | Noted: 2024-12-31

## 2024-12-31 PROBLEM — Z79.811 ENCOUNTER FOR MONITORING AROMATASE INHIBITOR THERAPY: Status: ACTIVE | Noted: 2024-12-31

## 2024-12-31 PROBLEM — Z09 ONCOLOGY FOLLOW-UP ENCOUNTER: Status: ACTIVE | Noted: 2024-12-31
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
## 2025-01-02 ENCOUNTER — OFFICE VISIT (OUTPATIENT)
Dept: HEMATOLOGY/ONCOLOGY | Facility: CLINIC | Age: 65
End: 2025-01-02
Payer: COMMERCIAL

## 2025-01-02 VITALS
BODY MASS INDEX: 30.3 KG/M2 | TEMPERATURE: 97.2 F | DIASTOLIC BLOOD PRESSURE: 68 MMHG | HEART RATE: 85 BPM | SYSTOLIC BLOOD PRESSURE: 110 MMHG | WEIGHT: 182.1 LBS

## 2025-01-02 DIAGNOSIS — Z51.81 ENCOUNTER FOR MONITORING ZOLEDRONIC ACID THERAPY: ICD-10-CM

## 2025-01-02 DIAGNOSIS — Z17.0 MALIGNANT NEOPLASM OF UPPER-OUTER QUADRANT OF RIGHT BREAST IN FEMALE, ESTROGEN RECEPTOR POSITIVE: Primary | ICD-10-CM

## 2025-01-02 DIAGNOSIS — Z79.83 ENCOUNTER FOR MONITORING ZOLEDRONIC ACID THERAPY: ICD-10-CM

## 2025-01-02 DIAGNOSIS — Z85.3 HISTORY OF RIGHT BREAST CANCER: ICD-10-CM

## 2025-01-02 DIAGNOSIS — C50.411 MALIGNANT NEOPLASM OF UPPER-OUTER QUADRANT OF RIGHT BREAST IN FEMALE, ESTROGEN RECEPTOR POSITIVE: Primary | ICD-10-CM

## 2025-01-02 DIAGNOSIS — Z51.81 ENCOUNTER FOR MONITORING AROMATASE INHIBITOR THERAPY: ICD-10-CM

## 2025-01-02 DIAGNOSIS — Z09 ONCOLOGY FOLLOW-UP ENCOUNTER: ICD-10-CM

## 2025-01-02 DIAGNOSIS — Z79.811 ENCOUNTER FOR MONITORING AROMATASE INHIBITOR THERAPY: ICD-10-CM

## 2025-01-02 PROCEDURE — 99417 PROLNG OP E/M EACH 15 MIN: CPT | Performed by: NURSE PRACTITIONER

## 2025-01-02 PROCEDURE — 99215 OFFICE O/P EST HI 40 MIN: CPT | Performed by: NURSE PRACTITIONER

## 2025-01-02 PROCEDURE — G2211 COMPLEX E/M VISIT ADD ON: HCPCS | Performed by: NURSE PRACTITIONER

## 2025-01-02 RX ORDER — ANASTROZOLE 1 MG/1
1 TABLET ORAL DAILY
Qty: 30 TABLET | Refills: 11 | Status: SHIPPED | OUTPATIENT
Start: 2025-01-02

## 2025-01-02 ASSESSMENT — PAIN SCALES - GENERAL: PAINLEVEL_OUTOF10: 0-NO PAIN

## 2025-01-02 NOTE — PATIENT INSTRUCTIONS
Please call us at [phone] option 5 then option 2 with any questions or concerns       1. Exercise 2.5 hours per week; bone strengthening, cardio-vascular, resistance training.  2. Please do self breast exams monthly.  3. Keep alcohol under 3 drinks per week.  4. Sun safety - limit sun exposure from 11a-2p when its at its hottest, apply 15-30 sun block and re-apply every 1-2 hours if perspiring or swimming.  5. Eat a plant based diet, add in oily fishes such as mackerel, tuna, and salmon.  6. Get in at least 1,000 mg of calcium per day through diet or supplement for bone strength. Examples of foods higher in calcium are milk, yogurt, fruited yogurt, oranges, fortified orange juice, almonds, almond milk, broccoli, spinach, bok dmitri, mustard greens, puddings, custards, ice cream, fortified cereals, bars, and crackers.   7. Please continue on your anastrozole 1mg daily. A refill was sent to [facility]. Next Zometa scheduled for 1-7-2025.  8. Please call the office if any new mass or rash in or around breast, or any uncontrolled symptoms that last over 2-3 weeks at [phone].  9. Your exam is negative!  10. It was nice seeing you today, Rebeca. Best of luck on rebuilding your home. I will see you back in May.    Thank you for choosing [facility] with your care

## 2025-01-07 ENCOUNTER — INFUSION (OUTPATIENT)
Dept: HEMATOLOGY/ONCOLOGY | Facility: CLINIC | Age: 65
End: 2025-01-07
Payer: COMMERCIAL

## 2025-01-07 VITALS
TEMPERATURE: 96.6 F | RESPIRATION RATE: 18 BRPM | DIASTOLIC BLOOD PRESSURE: 75 MMHG | SYSTOLIC BLOOD PRESSURE: 133 MMHG | HEART RATE: 79 BPM | BODY MASS INDEX: 30.3 KG/M2 | WEIGHT: 182.1 LBS | OXYGEN SATURATION: 98 %

## 2025-01-07 DIAGNOSIS — Z17.0 MALIGNANT NEOPLASM OF UPPER-OUTER QUADRANT OF RIGHT BREAST IN FEMALE, ESTROGEN RECEPTOR POSITIVE: ICD-10-CM

## 2025-01-07 DIAGNOSIS — C50.411 MALIGNANT NEOPLASM OF UPPER-OUTER QUADRANT OF RIGHT BREAST IN FEMALE, ESTROGEN RECEPTOR POSITIVE: ICD-10-CM

## 2025-01-07 PROCEDURE — 2500000004 HC RX 250 GENERAL PHARMACY W/ HCPCS (ALT 636 FOR OP/ED): Performed by: NURSE PRACTITIONER

## 2025-01-07 PROCEDURE — 96365 THER/PROPH/DIAG IV INF INIT: CPT | Mod: INF

## 2025-01-07 RX ORDER — EPINEPHRINE 0.3 MG/.3ML
0.3 INJECTION SUBCUTANEOUS EVERY 5 MIN PRN
Status: DISCONTINUED | OUTPATIENT
Start: 2025-01-07 | End: 2025-01-07 | Stop reason: HOSPADM

## 2025-01-07 RX ORDER — EPINEPHRINE 0.3 MG/.3ML
0.3 INJECTION SUBCUTANEOUS EVERY 5 MIN PRN
OUTPATIENT
Start: 2025-06-09

## 2025-01-07 RX ORDER — DIPHENHYDRAMINE HYDROCHLORIDE 50 MG/ML
50 INJECTION INTRAMUSCULAR; INTRAVENOUS AS NEEDED
OUTPATIENT
Start: 2025-06-09

## 2025-01-07 RX ORDER — SODIUM CHLORIDE 9 MG/ML
1000 INJECTION, SOLUTION INTRAVENOUS ONCE
Start: 2025-06-09 | End: 2025-06-09

## 2025-01-07 RX ORDER — FAMOTIDINE 10 MG/ML
20 INJECTION INTRAVENOUS ONCE AS NEEDED
OUTPATIENT
Start: 2025-06-09

## 2025-01-07 RX ORDER — DIPHENHYDRAMINE HYDROCHLORIDE 50 MG/ML
50 INJECTION INTRAMUSCULAR; INTRAVENOUS AS NEEDED
Status: DISCONTINUED | OUTPATIENT
Start: 2025-01-07 | End: 2025-01-07 | Stop reason: HOSPADM

## 2025-01-07 RX ORDER — ZOLEDRONIC ACID 0.04 MG/ML
4 INJECTION, SOLUTION INTRAVENOUS ONCE
Status: COMPLETED | OUTPATIENT
Start: 2025-01-07 | End: 2025-01-07

## 2025-01-07 RX ORDER — ALBUTEROL SULFATE 0.83 MG/ML
3 SOLUTION RESPIRATORY (INHALATION) AS NEEDED
OUTPATIENT
Start: 2025-06-09

## 2025-01-07 RX ORDER — SODIUM CHLORIDE 9 MG/ML
1000 INJECTION, SOLUTION INTRAVENOUS ONCE
Status: COMPLETED | OUTPATIENT
Start: 2025-01-07 | End: 2025-01-07

## 2025-01-07 RX ORDER — FAMOTIDINE 10 MG/ML
20 INJECTION INTRAVENOUS ONCE AS NEEDED
Status: DISCONTINUED | OUTPATIENT
Start: 2025-01-07 | End: 2025-01-07 | Stop reason: HOSPADM

## 2025-01-07 RX ORDER — ZOLEDRONIC ACID 0.04 MG/ML
4 INJECTION, SOLUTION INTRAVENOUS ONCE
OUTPATIENT
Start: 2025-06-09

## 2025-01-07 RX ORDER — ALBUTEROL SULFATE 0.83 MG/ML
3 SOLUTION RESPIRATORY (INHALATION) AS NEEDED
Status: DISCONTINUED | OUTPATIENT
Start: 2025-01-07 | End: 2025-01-07 | Stop reason: HOSPADM

## 2025-01-07 RX ADMIN — SODIUM CHLORIDE 1000 ML/HR: 9 INJECTION, SOLUTION INTRAVENOUS at 09:01

## 2025-01-07 RX ADMIN — ZOLEDRONIC ACID 4 MG: 0.04 INJECTION, SOLUTION INTRAVENOUS at 09:04

## 2025-01-07 ASSESSMENT — PAIN SCALES - GENERAL: PAINLEVEL_OUTOF10: 2

## 2025-03-12 DIAGNOSIS — F32.A DEPRESSION, UNSPECIFIED DEPRESSION TYPE: ICD-10-CM

## 2025-03-12 RX ORDER — SERTRALINE HYDROCHLORIDE 100 MG/1
100 TABLET, FILM COATED ORAL DAILY
Qty: 90 TABLET | Refills: 1 | Status: SHIPPED | OUTPATIENT
Start: 2025-03-12

## 2025-03-26 ENCOUNTER — HOSPITAL ENCOUNTER (OUTPATIENT)
Dept: RADIOLOGY | Facility: HOSPITAL | Age: 65
Discharge: HOME | End: 2025-03-26
Payer: COMMERCIAL

## 2025-03-26 DIAGNOSIS — Z17.0 MALIGNANT NEOPLASM OF UPPER-OUTER QUADRANT OF RIGHT BREAST IN FEMALE, ESTROGEN RECEPTOR POSITIVE: ICD-10-CM

## 2025-03-26 DIAGNOSIS — C50.411 MALIGNANT NEOPLASM OF UPPER-OUTER QUADRANT OF RIGHT BREAST IN FEMALE, ESTROGEN RECEPTOR POSITIVE: ICD-10-CM

## 2025-03-26 PROCEDURE — 77065 DX MAMMO INCL CAD UNI: CPT | Mod: RIGHT SIDE | Performed by: RADIOLOGY

## 2025-03-26 PROCEDURE — 77061 BREAST TOMOSYNTHESIS UNI: CPT | Mod: RT

## 2025-03-26 PROCEDURE — 77061 BREAST TOMOSYNTHESIS UNI: CPT | Mod: RIGHT SIDE | Performed by: RADIOLOGY

## 2025-04-11 DIAGNOSIS — C50.411 MALIGNANT NEOPLASM OF UPPER-OUTER QUADRANT OF RIGHT BREAST IN FEMALE, ESTROGEN RECEPTOR POSITIVE: Primary | ICD-10-CM

## 2025-04-11 DIAGNOSIS — Z17.0 MALIGNANT NEOPLASM OF UPPER-OUTER QUADRANT OF RIGHT BREAST IN FEMALE, ESTROGEN RECEPTOR POSITIVE: Primary | ICD-10-CM

## 2025-05-28 ENCOUNTER — OFFICE VISIT (OUTPATIENT)
Dept: HEMATOLOGY/ONCOLOGY | Facility: CLINIC | Age: 65
End: 2025-05-28
Payer: COMMERCIAL

## 2025-05-28 VITALS
BODY MASS INDEX: 30.29 KG/M2 | RESPIRATION RATE: 18 BRPM | SYSTOLIC BLOOD PRESSURE: 123 MMHG | HEART RATE: 86 BPM | WEIGHT: 182 LBS | DIASTOLIC BLOOD PRESSURE: 74 MMHG | TEMPERATURE: 94 F | OXYGEN SATURATION: 98 %

## 2025-05-28 DIAGNOSIS — Z17.0 MALIGNANT NEOPLASM OF UPPER-OUTER QUADRANT OF RIGHT BREAST IN FEMALE, ESTROGEN RECEPTOR POSITIVE: ICD-10-CM

## 2025-05-28 DIAGNOSIS — Z79.811 ENCOUNTER FOR MONITORING AROMATASE INHIBITOR THERAPY: ICD-10-CM

## 2025-05-28 DIAGNOSIS — Z85.3 ENCOUNTER FOR FOLLOW-UP SURVEILLANCE OF BREAST CANCER: ICD-10-CM

## 2025-05-28 DIAGNOSIS — Z08 ENCOUNTER FOR FOLLOW-UP SURVEILLANCE OF BREAST CANCER: ICD-10-CM

## 2025-05-28 DIAGNOSIS — Z92.3 HISTORY OF EXTERNAL BEAM RADIATION THERAPY: ICD-10-CM

## 2025-05-28 DIAGNOSIS — Z51.81 ENCOUNTER FOR MONITORING ZOLEDRONIC ACID THERAPY: Primary | ICD-10-CM

## 2025-05-28 DIAGNOSIS — Z85.3 HISTORY OF RIGHT BREAST CANCER: ICD-10-CM

## 2025-05-28 DIAGNOSIS — C50.411 MALIGNANT NEOPLASM OF UPPER-OUTER QUADRANT OF RIGHT BREAST IN FEMALE, ESTROGEN RECEPTOR POSITIVE: ICD-10-CM

## 2025-05-28 DIAGNOSIS — Z79.83 ENCOUNTER FOR MONITORING ZOLEDRONIC ACID THERAPY: Primary | ICD-10-CM

## 2025-05-28 DIAGNOSIS — Z51.81 ENCOUNTER FOR MONITORING AROMATASE INHIBITOR THERAPY: ICD-10-CM

## 2025-05-28 PROCEDURE — 1036F TOBACCO NON-USER: CPT | Performed by: NURSE PRACTITIONER

## 2025-05-28 PROCEDURE — 99214 OFFICE O/P EST MOD 30 MIN: CPT | Performed by: NURSE PRACTITIONER

## 2025-05-28 RX ORDER — ANASTROZOLE 1 MG/1
1 TABLET ORAL DAILY
Qty: 90 TABLET | Refills: 3 | Status: SHIPPED | OUTPATIENT
Start: 2025-05-28

## 2025-05-28 ASSESSMENT — PAIN SCALES - GENERAL: PAINLEVEL_OUTOF10: 0-NO PAIN

## 2025-05-28 NOTE — PATIENT INSTRUCTIONS
Please call us at [phone] then follow the prompts.    1. Exercise 2.5 hours per week; bone strengthening, cardio-vascular, resistance training.  2. Please do self breast exams monthly.  3. Keep alcohol under 3 drinks per week.  4. Sun safety - limit sun exposure from 11a-2p when its at its hottest, apply 15-30 sun block and re-apply every 1-2 hours if perspiring or swimming.  5. Eat a plant based diet, add in oily fishes such as mackerel, tuna, and salmon.  6. Get in at least 1,000 mg of calcium per day through diet or supplement for bone strength. Examples of foods higher in calcium are milk, yogurt, fruited yogurt, oranges, fortified orange juice, almonds, almond milk, broccoli, spinach, bok dmitri, mustard greens, puddings, custards, ice cream, fortified cereals, bars, and crackers.   7. Please continue on your anastrozole 1 mg tablet daily.  7.5 Next Zometa treatment is scheduled for 7-2-2025. You can schedule you next infusion for December 2025 and June of 2026  8. Please call the office if any new mass or rash in or around breast, or any uncontrolled symptoms that last over 2-3 weeks at [phone].  9. Please schedule your yearly mammogram for any day after 9-  10. It was nice seeing you today, Rebeca . I will see you back in the spring of 2026 . Thank you for choosing [facility] with your care. Have a nice spring and summer!

## 2025-05-28 NOTE — PROGRESS NOTES
Oncology Follow-Up    Rebeca Ko  [id number]        Cancer Staging   Malignant neoplasm of upper-outer quadrant of female breast  Staging form: Breast, AJCC 8th Edition  - Pathologic: Stage IA (pT1b, pN0(i+)(sn), cM0, G1, ER+, AK+, HER2-) - Signed by Alka Alejo MD on 9/26/2024    Oncology History   Malignant neoplasm of upper-outer quadrant of female breast   11/6/2023 Initial Diagnosis    Malignant neoplasm of upper-outer quadrant of female breast (Multi)     9/26/2024 Cancer Staged    Staging form: Breast, AJCC 8th Edition, Pathologic: Stage IA (pT1b, pN0(i+)(sn), cM0, G1, ER+, AK+, HER2-) - Signed by Alka Alejo MD on 9/26/2024     63-year-old postmenopausal  female with right-sided invasive ductal carcinoma, stage IA (T1b N1mi M0). The patient's breast cancer was diagnosed on November 14, 2022, and is grade 1, estrogen receptor positive at 80- 90%, progesterone receptor positive at 80-90%, and HER-2/samantha negative. MammaPrint Index = -0.024; translating to High Risk Luminal-Type B.        Details of her history are as follows:      04//02/2022: Patient underwent a routine screening mammogram. This revealed an asymmetry in her right breast.    4/05/2022: Patient underwent a right diagnostic mammogram and US. This confirmed an asymmetry in the left breast that was non-revealing on the US.   11/02/2022: Patient underwent a repeat 6-month right diagnostic mammogram and US. This revealed a 0.5cm mass in the right breast at 11-12:00, 7.4cm from the nipple measuring 0.5cm. No abnormal LNs were seen in the right axilla.   11/04/2022: Patient underwent and US -guided needle biopsy of the right breast at 12:00. Pathology was consistent with above   01/05/2023: Patient underwent a right partial mastectomy with SLNB. Pathology showed an 8mm invasive ductal carcinoma, grade 1 with negative margins, no LVI with 1/1 SLN with micrometastatic disease measuring 0.5mm      Leif    Rebeca presents for  her Oncology Follow Up Visit. She continues on anastrozole and Zometa infusions with good tolerance. She reports occasional arm swelling (right) though has massages monthly that helps. She is doing Breast self exams monthly. Rebeca sees dermatology yearly. She rates her energy level as 6/10 and a distress score of 2/10 related to usual life issues. She is up to date with her colonoscopy and GYN screenings, and is up to date on vaccines. She sees Dr. Feliz in primary care regularly. Rebeca denies any unusual headaches, balance issues, depression, cough, shortness of breath, problems swallowing, changes in chest/breast area, abdominal pain, bone or muscle pain, vaginal bleeding, rectal bleeding, blood in the urine, vaginal dryness, swelling arms or legs, new or unusual skin moles or lesions.         Objective      Vitals:    05/28/25 1115   BP: 123/74   Pulse: 86   Resp: 18   Temp: 34.4 °C (94 °F)   SpO2: 98%        Constitutional: Well developed, alert/oriented x3, no distress, cooperative   Eyes: clear sclera   ENMT: mucous membranes moist, no apparent lesions   Head/Neck: Neck supple, no bruits   Respiratory/Thorax: Patent airways, normal breath sounds with good chest expansion   Cardiovascular: Regular rate and rhythm, no murmurs, 2+ equal pulses of the extremities,   Gastrointestinal: Nondistended, soft, non-tender, no masses palpable, no organomegaly   Musculoskeletal: ROM intact, no joint swelling, normal strength   Extremities: normal extremities, no edema, cyanosis, contusions or wounds   Neurological: alert and oriented x3,  normal strength   Breast:     Lymphatic: No significant lymphadenopathy   Psychological: Appropriate mood and behavior   Skin: Warm and dry, no lesions, no rashes      Physical Exam  Chest:          Comments: Right breast positive for breast conserving surgery with well healed UOQ and axillary incisions; no masses, nodules, skin changes, discharge.  Left breast without masses, nodules,  skin changes, discharge. Left breast without masses, nodules, skin changes, discharge.          Lab Results   Component Value Date    WBC 6.4 2024    HGB 14.1 2024    HCT 44.1 2024    MCV 87 2024     2024       Chemistry    Lab Results   Component Value Date/Time     2024 1324    K 4.0 2024 1324     2024 1324    CO2 32 2024 1324    BUN 21 2024 1324    CREATININE 0.99 2024 1324    Lab Results   Component Value Date/Time    CALCIUM 10.1 2024 1324    ALKPHOS 71 2024 1324    AST 21 2024 1324    ALT 22 2024 1324    BILITOT 0.4 2024 1324         Imagin2024 10:49 [phone]      Exam Information    Status Exam Begun Exam Ended   Final 2024 08:14 2024 08:34     Study Result    Narrative & Impression   Interpreted By:  Sergey Mccall,   STUDY:  BI MAMMO BILATERAL SCREENING TOMOSYNTHESIS;  2024 8:34 am      ACCESSION NUMBER(S):  [id number]      ORDERING CLINICIAN:  ARIES ALICIA      INDICATION:  Screening. History of right breast cancer treated with lumpectomy and  radiation.      ,Z12.31 Encounter for screening mammogram for malignant neoplasm of  breast,Z85.3 Personal history of malignant neoplasm of breast      COMPARISON:  09/15/2023 and all relevant prior breast imaging exams available at  the time of dictation.      FINDINGS:  2D and tomosynthesis images were reviewed at 1 mm slice thickness.  Per technologist, best possible images were obtained.      Density:  There are scattered areas of fibroglandular density.      Postsurgical changes of lumpectomy are noted in the upper-outer right  breast at posterior depth and right axilla. Interval decrease in mild  diffuse skin and trabecular thickening of the right breast secondary  to postradiation changes. No suspicious masses or calcifications are  identified.      IMPRESSION:  No mammographic evidence of malignancy.      BI-RADS  CATEGORY:  BI-RADS Category:  2 Benign.  Recommendation:  Annual Screening.  Recommended Date:  1 Year.  Laterality:  Bilateral.       Exam Information    Status Exam Begun Exam Ended   Final 9/15/2023 08:46      Study Result    Narrative & Impression   PROCEDURE:         DEXA BONE DENSITY STUDY - IXR  0269  REASON FOR EXAM: C50.411     RESULT: MRN: [id number]  Patient Name: REBECA NIX     STUDY:  DEXA BONE DENSITY STUDY9/15/2023 8:46 am     INDICATION:  C50.411. The patient is a 64 y/o  year old F. Postmenopausal     COMPARISON:  DEXA scan 11/17/2015     ACCESSION NUMBER(S):  [id number]     ORDERING CLINICIAN:  KAREN LANTIGUA     TECHNIQUE:  DEXA BONE DENSITY STUDY     FINDINGS:  SPINE L1-L4  Bone Mineral Density: 1.105 g/cm2  T-Score 0.5  Z-Score 2.1  Bone Mineral Density change vs baseline:  0.5 %  Bone Mineral Density change vs previous: 0.5 %     LEFT FEMUR -TOTAL  Bone Mineral Density: 1.146 g/cm2  T-Score 1.7   Z-Score  2.7  Bone Mineral Density change vs baseline: -0.5 %  Bone Mineral Density change vs previous: -0.5 %     LEFT FEMUR -NECK  Bone Mineral Density: 0.871 g/cm2  T-Score 0.2  Z-Score 1.6        World Health Organization (WHO) criteria for post-menopausal,   Women:  Normal:         T-score at or above -1 SD  Osteopenia:   T-score between -1 and -2.5 SD  Osteoporosis: T-score at or below -2.5 SD        10-year Fracture Risk(1):  Major Osteoporotic Fracture  6.1 %  Hip Fracture        Assessment/Plan    Rebeca is a 66 yo woman with a history of T1bN0 G-1 right IDC diagnosed September 2022. She is s/p partial mastectomy, XRT, and is currently on anastrozole daily and zoledronic acid infusions every 24 weeks.   There is no evidence of recurrent disease on today's exam.     Plan:  Exam is negative.  Continue anastrozole 1mg daily.  Next Zometa infusion is 7/2/25 (dose #4). The plan is for 6 doses.  Reviewed lymphedema massage - should be light to the touch moving toward the heart.  Will order  bone density at next visit.  See Dr. Hernandez in September with her mammogram.   Encouraged monthly breast self exams, plant based diet, keep alcohol <3 drinks/week, exercise at least 2.5 hours/week.  We reviewed signs/symptoms of recurrence including new masses, new pigmented lesion, tugging or pulling of the skin, nipple discharge, rash in or around the chest area, or any new finding that doesn't resolve within a 2-3 weeks.  All of Rebeca's questions/concerns were addressed.  Over 20 minutes of time was spent with this patient with >50% of the time with education, counseling, and coordination of care.   I will see Rebeca back in March to rotate visits with Dr. Hernandez on a 6 month basis. Rebeca will call with any concerns.          Diagnoses and all orders for this visit:  Encounter for monitoring zoledronic acid therapy  Malignant neoplasm of upper-outer quadrant of right breast in female, estrogen receptor positive  -     Clinic Appointment Request Follow Up; ARIES ALICIA; Future  -     Infusion Appointment Request; Future  -     Infusion Appointment Request; Future  -     Clinic Appointment Request Follow Up; ARIES ALICIA  Encounter for monitoring aromatase inhibitor therapy  History of external beam radiation therapy  Encounter for follow-up surveillance of breast cancer        Aries Alicia, APRN-CNP

## 2025-06-04 ASSESSMENT — PROMIS GLOBAL HEALTH SCALE
RATE_PHYSICAL_HEALTH: GOOD
RATE_QUALITY_OF_LIFE: GOOD
RATE_MENTAL_HEALTH: GOOD
CARRYOUT_PHYSICAL_ACTIVITIES: MOSTLY
RATE_GENERAL_HEALTH: GOOD
RATE_SOCIAL_SATISFACTION: VERY GOOD
RATE_AVERAGE_PAIN: 4
CARRYOUT_SOCIAL_ACTIVITIES: GOOD
RATE_AVERAGE_FATIGUE: MODERATE
EMOTIONAL_PROBLEMS: SOMETIMES

## 2025-06-05 ENCOUNTER — APPOINTMENT (OUTPATIENT)
Dept: PRIMARY CARE | Facility: CLINIC | Age: 65
End: 2025-06-05
Payer: COMMERCIAL

## 2025-06-05 VITALS
SYSTOLIC BLOOD PRESSURE: 122 MMHG | OXYGEN SATURATION: 98 % | DIASTOLIC BLOOD PRESSURE: 76 MMHG | WEIGHT: 180.6 LBS | RESPIRATION RATE: 18 BRPM | HEIGHT: 65 IN | BODY MASS INDEX: 30.09 KG/M2 | HEART RATE: 79 BPM | TEMPERATURE: 97.8 F

## 2025-06-05 DIAGNOSIS — Z13.29 SCREENING FOR THYROID DISORDER: ICD-10-CM

## 2025-06-05 DIAGNOSIS — Z85.3 HISTORY OF BREAST CANCER: ICD-10-CM

## 2025-06-05 DIAGNOSIS — Z00.00 WELL ADULT EXAM: ICD-10-CM

## 2025-06-05 DIAGNOSIS — Z13.820 ENCOUNTER FOR SCREENING FOR OSTEOPOROSIS: ICD-10-CM

## 2025-06-05 DIAGNOSIS — Z11.59 ENCOUNTER FOR HEPATITIS C SCREENING TEST FOR LOW RISK PATIENT: ICD-10-CM

## 2025-06-05 DIAGNOSIS — E55.9 VITAMIN D DEFICIENCY: Primary | ICD-10-CM

## 2025-06-05 DIAGNOSIS — Z13.820 SCREENING FOR OSTEOPOROSIS: ICD-10-CM

## 2025-06-05 DIAGNOSIS — F32.A DEPRESSION, UNSPECIFIED DEPRESSION TYPE: ICD-10-CM

## 2025-06-05 DIAGNOSIS — M25.541 ARTHRALGIA OF BOTH HANDS: ICD-10-CM

## 2025-06-05 DIAGNOSIS — M25.542 ARTHRALGIA OF BOTH HANDS: ICD-10-CM

## 2025-06-05 DIAGNOSIS — F41.9 ANXIETY: ICD-10-CM

## 2025-06-05 DIAGNOSIS — Z13.220 ENCOUNTER FOR SCREENING FOR LIPID DISORDER: ICD-10-CM

## 2025-06-05 PROCEDURE — 3008F BODY MASS INDEX DOCD: CPT | Performed by: NURSE PRACTITIONER

## 2025-06-05 PROCEDURE — 1036F TOBACCO NON-USER: CPT | Performed by: NURSE PRACTITIONER

## 2025-06-05 PROCEDURE — 99396 PREV VISIT EST AGE 40-64: CPT | Performed by: NURSE PRACTITIONER

## 2025-06-05 RX ORDER — SERTRALINE HYDROCHLORIDE 100 MG/1
100 TABLET, FILM COATED ORAL DAILY
Qty: 90 TABLET | Refills: 1 | Status: SHIPPED | OUTPATIENT
Start: 2025-06-05

## 2025-06-05 ASSESSMENT — LIFESTYLE VARIABLES
HOW OFTEN DO YOU HAVE A DRINK CONTAINING ALCOHOL: MONTHLY OR LESS
HOW MANY STANDARD DRINKS CONTAINING ALCOHOL DO YOU HAVE ON A TYPICAL DAY: 1 OR 2
HOW OFTEN DO YOU HAVE SIX OR MORE DRINKS ON ONE OCCASION: NEVER
AUDIT-C TOTAL SCORE: 1
SKIP TO QUESTIONS 9-10: 1

## 2025-06-05 ASSESSMENT — PATIENT HEALTH QUESTIONNAIRE - PHQ9
1. LITTLE INTEREST OR PLEASURE IN DOING THINGS: NOT AT ALL
SUM OF ALL RESPONSES TO PHQ9 QUESTIONS 1 AND 2: 0
2. FEELING DOWN, DEPRESSED OR HOPELESS: NOT AT ALL

## 2025-06-05 ASSESSMENT — PAIN SCALES - GENERAL: PAINLEVEL_OUTOF10: 2

## 2025-06-05 NOTE — PROGRESS NOTES
"Subjective   Patient ID: Rebeca Ko is a 65 y.o. female who presents for Annual Exam (PT IS HERE FOR ANNUAL EXAM AND REFILLS ).    HERE FOR A WELL VISIT HAS SEEN OPTH DERM SAW RUSS ARTHGLIA IN HANDS         Review of Systems    Objective   /76   Pulse 79   Temp 36.6 °C (97.8 °F)   Resp 18   Ht 1.651 m (5' 5\")   Wt 81.9 kg (180 lb 9.6 oz)   SpO2 98%   BMI 30.05 kg/m²     Physical Exam  Vitals and nursing note reviewed.   Constitutional:       General: She is not in acute distress.     Appearance: Normal appearance.   HENT:      Right Ear: Tympanic membrane and ear canal normal.      Left Ear: Tympanic membrane and ear canal normal.      Nose: Nose normal. No rhinorrhea.      Mouth/Throat:      Pharynx: Oropharynx is clear. No oropharyngeal exudate or posterior oropharyngeal erythema.      Comments: Dentition wnl  Eyes:      Extraocular Movements: Extraocular movements intact.      Conjunctiva/sclera: Conjunctivae normal.      Pupils: Pupils are equal, round, and reactive to light.   Neck:      Vascular: No carotid bruit.   Cardiovascular:      Rate and Rhythm: Normal rate and regular rhythm.      Heart sounds: Normal heart sounds. No murmur heard.  Pulmonary:      Breath sounds: Normal breath sounds. No wheezing or rhonchi.   Abdominal:      General: Bowel sounds are normal. There is no distension.      Palpations: Abdomen is soft. There is no mass.      Tenderness: There is no abdominal tenderness. There is no guarding or rebound.      Hernia: No hernia is present.   Genitourinary:     Comments: BREAST EXAM NL   Musculoskeletal:         General: No swelling or tenderness. Normal range of motion.      Cervical back: Normal range of motion and neck supple.   Lymphadenopathy:      Cervical: No cervical adenopathy.   Skin:     General: Skin is warm.      Findings: No rash.   Neurological:      General: No focal deficit present.      Mental Status: She is alert.       Assessment/Plan   Problem List " Items Addressed This Visit           ICD-10-CM    Anxiety F41.9    Relevant Medications    sertraline (Zoloft) 100 mg tablet    Depressed F32.A    Relevant Medications    sertraline (Zoloft) 100 mg tablet    Vitamin D deficiency - Primary E55.9    Relevant Orders    Vitamin D 25-Hydroxy,Total (for eval of Vitamin D levels)     Other Visit Diagnoses         Codes      History of breast cancer     Z85.3    Relevant Orders    Magnesium    Phosphorus      Screening for osteoporosis     Z13.820      Arthralgia of both hands     M25.541, M25.542    Relevant Orders    Referral to Rheumatology    Referral to Rheumatology      Well adult exam     Z00.00    Relevant Orders    Comprehensive Metabolic Panel    CBC and Auto Differential      Screening for thyroid disorder     Z13.29    Relevant Orders    TSH with reflex to Free T4 if abnormal      Encounter for screening for lipid disorder     Z13.220    Relevant Orders    Lipid Panel      Encounter for hepatitis C screening test for low risk patient     Z11.59    Relevant Orders    Hepatitis C antibody      Encounter for screening for osteoporosis     Z13.820    Relevant Orders    XR DEXA bone density          Great to see you, will call with results . Discussed shingles shots

## 2025-06-06 LAB
25(OH)D3+25(OH)D2 SERPL-MCNC: 50 NG/ML (ref 30–100)
ALBUMIN SERPL-MCNC: 4.6 G/DL (ref 3.6–5.1)
ALP SERPL-CCNC: 55 U/L (ref 37–153)
ALT SERPL-CCNC: 17 U/L (ref 6–29)
ANION GAP SERPL CALCULATED.4IONS-SCNC: 9 MMOL/L (CALC) (ref 7–17)
AST SERPL-CCNC: 17 U/L (ref 10–35)
BASOPHILS # BLD AUTO: 60 CELLS/UL (ref 0–200)
BASOPHILS NFR BLD AUTO: 1 %
BILIRUB SERPL-MCNC: 0.5 MG/DL (ref 0.2–1.2)
BUN SERPL-MCNC: 20 MG/DL (ref 7–25)
CALCIUM SERPL-MCNC: 10 MG/DL (ref 8.6–10.4)
CHLORIDE SERPL-SCNC: 104 MMOL/L (ref 98–110)
CHOLEST SERPL-MCNC: 263 MG/DL
CHOLEST/HDLC SERPL: 4.1 (CALC)
CO2 SERPL-SCNC: 27 MMOL/L (ref 20–32)
CREAT SERPL-MCNC: 0.7 MG/DL (ref 0.5–1.05)
EGFRCR SERPLBLD CKD-EPI 2021: 97 ML/MIN/1.73M2
EOSINOPHIL # BLD AUTO: 120 CELLS/UL (ref 15–500)
EOSINOPHIL NFR BLD AUTO: 2 %
ERYTHROCYTE [DISTWIDTH] IN BLOOD BY AUTOMATED COUNT: 14.4 % (ref 11–15)
GLUCOSE SERPL-MCNC: 110 MG/DL (ref 65–99)
HCT VFR BLD AUTO: 44.9 % (ref 35–45)
HCV AB SERPL QL IA: NORMAL
HDLC SERPL-MCNC: 64 MG/DL
HGB BLD-MCNC: 14.5 G/DL (ref 11.7–15.5)
LDLC SERPL CALC-MCNC: 166 MG/DL (CALC)
LYMPHOCYTES # BLD AUTO: 2430 CELLS/UL (ref 850–3900)
LYMPHOCYTES NFR BLD AUTO: 40.5 %
MAGNESIUM SERPL-MCNC: 2.2 MG/DL (ref 1.5–2.5)
MCH RBC QN AUTO: 28.4 PG (ref 27–33)
MCHC RBC AUTO-ENTMCNC: 32.3 G/DL (ref 32–36)
MCV RBC AUTO: 88 FL (ref 80–100)
MONOCYTES # BLD AUTO: 426 CELLS/UL (ref 200–950)
MONOCYTES NFR BLD AUTO: 7.1 %
NEUTROPHILS # BLD AUTO: 2964 CELLS/UL (ref 1500–7800)
NEUTROPHILS NFR BLD AUTO: 49.4 %
NONHDLC SERPL-MCNC: 199 MG/DL (CALC)
PLATELET # BLD AUTO: 329 THOUSAND/UL (ref 140–400)
PMV BLD REES-ECKER: 9.6 FL (ref 7.5–12.5)
POTASSIUM SERPL-SCNC: 4.4 MMOL/L (ref 3.5–5.3)
PROT SERPL-MCNC: 7.1 G/DL (ref 6.1–8.1)
RBC # BLD AUTO: 5.1 MILLION/UL (ref 3.8–5.1)
SODIUM SERPL-SCNC: 140 MMOL/L (ref 135–146)
TRIGL SERPL-MCNC: 180 MG/DL
TSH SERPL-ACNC: 1.13 MIU/L (ref 0.4–4.5)
WBC # BLD AUTO: 6 THOUSAND/UL (ref 3.8–10.8)

## 2025-06-12 DIAGNOSIS — R73.09 ELEVATED GLUCOSE: ICD-10-CM

## 2025-07-02 ENCOUNTER — INFUSION (OUTPATIENT)
Dept: HEMATOLOGY/ONCOLOGY | Facility: CLINIC | Age: 65
End: 2025-07-02
Payer: COMMERCIAL

## 2025-07-02 VITALS
DIASTOLIC BLOOD PRESSURE: 74 MMHG | HEART RATE: 74 BPM | OXYGEN SATURATION: 96 % | WEIGHT: 180.89 LBS | BODY MASS INDEX: 30.1 KG/M2 | SYSTOLIC BLOOD PRESSURE: 116 MMHG | TEMPERATURE: 95 F

## 2025-07-02 DIAGNOSIS — C50.411 MALIGNANT NEOPLASM OF UPPER-OUTER QUADRANT OF RIGHT BREAST IN FEMALE, ESTROGEN RECEPTOR POSITIVE: ICD-10-CM

## 2025-07-02 DIAGNOSIS — Z17.0 MALIGNANT NEOPLASM OF UPPER-OUTER QUADRANT OF RIGHT BREAST IN FEMALE, ESTROGEN RECEPTOR POSITIVE: ICD-10-CM

## 2025-07-02 PROCEDURE — 2500000004 HC RX 250 GENERAL PHARMACY W/ HCPCS (ALT 636 FOR OP/ED): Performed by: NURSE PRACTITIONER

## 2025-07-02 PROCEDURE — 96365 THER/PROPH/DIAG IV INF INIT: CPT | Mod: INF

## 2025-07-02 RX ORDER — ZOLEDRONIC ACID 0.04 MG/ML
4 INJECTION, SOLUTION INTRAVENOUS ONCE
Status: COMPLETED | OUTPATIENT
Start: 2025-07-02 | End: 2025-07-02

## 2025-07-02 RX ORDER — EPINEPHRINE 0.3 MG/.3ML
0.3 INJECTION SUBCUTANEOUS EVERY 5 MIN PRN
Status: DISCONTINUED | OUTPATIENT
Start: 2025-07-02 | End: 2025-07-02 | Stop reason: HOSPADM

## 2025-07-02 RX ORDER — FAMOTIDINE 10 MG/ML
20 INJECTION, SOLUTION INTRAVENOUS ONCE AS NEEDED
OUTPATIENT
Start: 2025-12-03

## 2025-07-02 RX ORDER — ALBUTEROL SULFATE 0.83 MG/ML
3 SOLUTION RESPIRATORY (INHALATION) AS NEEDED
Status: DISCONTINUED | OUTPATIENT
Start: 2025-07-02 | End: 2025-07-02 | Stop reason: HOSPADM

## 2025-07-02 RX ORDER — ALBUTEROL SULFATE 0.83 MG/ML
3 SOLUTION RESPIRATORY (INHALATION) AS NEEDED
OUTPATIENT
Start: 2025-12-03

## 2025-07-02 RX ORDER — DIPHENHYDRAMINE HYDROCHLORIDE 50 MG/ML
50 INJECTION, SOLUTION INTRAMUSCULAR; INTRAVENOUS AS NEEDED
OUTPATIENT
Start: 2025-12-03

## 2025-07-02 RX ORDER — DIPHENHYDRAMINE HYDROCHLORIDE 50 MG/ML
50 INJECTION, SOLUTION INTRAMUSCULAR; INTRAVENOUS AS NEEDED
Status: DISCONTINUED | OUTPATIENT
Start: 2025-07-02 | End: 2025-07-02 | Stop reason: HOSPADM

## 2025-07-02 RX ORDER — ZOLEDRONIC ACID 0.04 MG/ML
4 INJECTION, SOLUTION INTRAVENOUS ONCE
OUTPATIENT
Start: 2025-12-03

## 2025-07-02 RX ORDER — SODIUM CHLORIDE 9 MG/ML
1000 INJECTION, SOLUTION INTRAVENOUS ONCE
Status: COMPLETED | OUTPATIENT
Start: 2025-07-02 | End: 2025-07-02

## 2025-07-02 RX ORDER — SODIUM CHLORIDE 9 MG/ML
1000 INJECTION, SOLUTION INTRAVENOUS ONCE
Start: 2025-12-03 | End: 2025-12-03

## 2025-07-02 RX ORDER — EPINEPHRINE 0.3 MG/.3ML
0.3 INJECTION SUBCUTANEOUS EVERY 5 MIN PRN
OUTPATIENT
Start: 2025-12-03

## 2025-07-02 RX ORDER — FAMOTIDINE 10 MG/ML
20 INJECTION, SOLUTION INTRAVENOUS ONCE AS NEEDED
Status: DISCONTINUED | OUTPATIENT
Start: 2025-07-02 | End: 2025-07-02 | Stop reason: HOSPADM

## 2025-07-02 RX ADMIN — SODIUM CHLORIDE 500 ML: 0.9 INJECTION, SOLUTION INTRAVENOUS at 09:57

## 2025-07-02 RX ADMIN — SODIUM CHLORIDE 1000 ML/HR: 0.9 INJECTION, SOLUTION INTRAVENOUS at 09:00

## 2025-07-02 RX ADMIN — ZOLEDRONIC ACID 4 MG: 0.04 INJECTION, SOLUTION INTRAVENOUS at 09:00

## 2025-07-02 ASSESSMENT — PAIN SCALES - GENERAL: PAINLEVEL_OUTOF10: 0-NO PAIN

## 2025-07-02 NOTE — PROGRESS NOTES
Pt present to clinic for Zometa infusion, pt continues to take calcium and vitamin D supplements. Denies any recent and upcoming invasive dental procedure. Pt calcium and crcl within range. pt tolerated today's infusion including hydration without difficulty.

## 2025-07-08 ENCOUNTER — APPOINTMENT (OUTPATIENT)
Dept: HEMATOLOGY/ONCOLOGY | Facility: CLINIC | Age: 65
End: 2025-07-08
Payer: COMMERCIAL

## 2025-07-23 ENCOUNTER — APPOINTMENT (OUTPATIENT)
Dept: DERMATOLOGY | Facility: CLINIC | Age: 65
End: 2025-07-23
Payer: COMMERCIAL

## 2025-08-19 ENCOUNTER — OFFICE VISIT (OUTPATIENT)
Dept: ORTHOPEDIC SURGERY | Facility: CLINIC | Age: 65
End: 2025-08-19
Payer: COMMERCIAL

## 2025-08-19 DIAGNOSIS — M65.311 TRIGGER FINGER OF RIGHT THUMB: Primary | ICD-10-CM

## 2025-08-19 PROCEDURE — 1036F TOBACCO NON-USER: CPT | Performed by: ORTHOPAEDIC SURGERY

## 2025-08-19 PROCEDURE — 99213 OFFICE O/P EST LOW 20 MIN: CPT | Performed by: ORTHOPAEDIC SURGERY

## 2025-08-19 PROCEDURE — 20550 NJX 1 TENDON SHEATH/LIGAMENT: CPT | Performed by: ORTHOPAEDIC SURGERY

## 2025-08-19 PROCEDURE — 99213 OFFICE O/P EST LOW 20 MIN: CPT | Mod: 25 | Performed by: ORTHOPAEDIC SURGERY

## 2025-08-19 PROCEDURE — 2500000004 HC RX 250 GENERAL PHARMACY W/ HCPCS (ALT 636 FOR OP/ED): Performed by: ORTHOPAEDIC SURGERY

## 2025-08-19 PROCEDURE — 76942 ECHO GUIDE FOR BIOPSY: CPT | Performed by: ORTHOPAEDIC SURGERY

## 2025-08-19 RX ADMIN — METHYLPREDNISOLONE ACETATE 20 MG: 40 INJECTION, SUSPENSION INTRA-ARTICULAR; INTRALESIONAL; INTRAMUSCULAR; SOFT TISSUE at 10:15

## 2025-08-19 RX ADMIN — LIDOCAINE HYDROCHLORIDE 1 ML: 10 INJECTION, SOLUTION INFILTRATION; PERINEURAL at 10:15

## 2025-08-19 ASSESSMENT — PAIN - FUNCTIONAL ASSESSMENT: PAIN_FUNCTIONAL_ASSESSMENT: 0-10

## 2025-08-19 ASSESSMENT — PAIN SCALES - GENERAL: PAINLEVEL_OUTOF10: 2

## 2025-08-20 ASSESSMENT — ENCOUNTER SYMPTOMS
WHEEZING: 0
TROUBLE SWALLOWING: 0
EYE DISCHARGE: 0
SHORTNESS OF BREATH: 0
JOINT SWELLING: 1
FEVER: 0
ARTHRALGIAS: 1
SINUS PAIN: 0
CHILLS: 0

## 2025-08-21 RX ORDER — LIDOCAINE HYDROCHLORIDE 10 MG/ML
1 INJECTION, SOLUTION INFILTRATION; PERINEURAL
Status: COMPLETED | OUTPATIENT
Start: 2025-08-19 | End: 2025-08-19

## 2025-08-21 RX ORDER — METHYLPREDNISOLONE ACETATE 40 MG/ML
20 INJECTION, SUSPENSION INTRA-ARTICULAR; INTRALESIONAL; INTRAMUSCULAR; SOFT TISSUE
Status: COMPLETED | OUTPATIENT
Start: 2025-08-19 | End: 2025-08-19

## 2025-12-09 ENCOUNTER — APPOINTMENT (OUTPATIENT)
Dept: HEMATOLOGY/ONCOLOGY | Facility: CLINIC | Age: 65
End: 2025-12-09
Payer: COMMERCIAL

## (undated) DEVICE — DRESSING, NON-ADHERENT, 3 X 3 IN, STERILE

## (undated) DEVICE — GLOVE, SURGICAL, PROTEXIS,  7.5, PF, LATEX

## (undated) DEVICE — TRAY, DRY PREP, PREMIUM

## (undated) DEVICE — SUTURE, PROLENE 4-0, 18IN, PS2, BLUE MONO

## (undated) DEVICE — CUFF, TOURNIQUET, 18 X 4, DUAL PORT/SNGL BLADDER, DISP, LF

## (undated) DEVICE — GLOVE, SURGICAL, PROTEXIS PI , 7.5, PF, LF

## (undated) DEVICE — BLANKET, LOWER BODY, VHA PLUS, ADULT

## (undated) DEVICE — BANDAGE, ELASTIC, ACE, SELF-CLOSURE, 2 IN X 5 YD, STERILE

## (undated) DEVICE — Device

## (undated) DEVICE — SOLUTION, IRRIGATION, 0.9% SODIUM CHLORIDE, 1000 ML, HANG BOTTLE

## (undated) DEVICE — DRESSING, NON-ADHERENT, OIL EMULSION, CURITY, 3 X 16 IN, STERILE

## (undated) DEVICE — SPONGE, GAUZE, XRAY DECT, 16 PLY, 4 X 4, W/MASTER DMT,STERILE

## (undated) DEVICE — SOLUTION, CHLORHEXIDINE, 4%, 4OZ

## (undated) DEVICE — CUFF, TOURNIQUET 18 DUAL PORT/SNGL BLAD"